# Patient Record
Sex: FEMALE | Race: WHITE | Employment: UNEMPLOYED | URBAN - METROPOLITAN AREA
[De-identification: names, ages, dates, MRNs, and addresses within clinical notes are randomized per-mention and may not be internally consistent; named-entity substitution may affect disease eponyms.]

---

## 2017-05-20 ENCOUNTER — COMMUNICATION - HEALTHEAST (OUTPATIENT)
Dept: SCHEDULING | Facility: CLINIC | Age: 62
End: 2017-05-20

## 2017-05-26 ENCOUNTER — HEALTH MAINTENANCE LETTER (OUTPATIENT)
Age: 62
End: 2017-05-26

## 2018-02-20 ENCOUNTER — RECORDS - HEALTHEAST (OUTPATIENT)
Dept: LAB | Facility: CLINIC | Age: 63
End: 2018-02-20

## 2018-02-21 LAB
25(OH)D3 SERPL-MCNC: 47.1 NG/ML (ref 30–80)
ALBUMIN SERPL-MCNC: 3.4 G/DL (ref 3.5–5)
ALP SERPL-CCNC: 103 U/L (ref 45–120)
ALT SERPL W P-5'-P-CCNC: 161 U/L (ref 0–45)
ANION GAP SERPL CALCULATED.3IONS-SCNC: 6 MMOL/L (ref 5–18)
AST SERPL W P-5'-P-CCNC: 115 U/L (ref 0–40)
BASOPHILS # BLD AUTO: 0 THOU/UL (ref 0–0.2)
BASOPHILS NFR BLD AUTO: 0 % (ref 0–2)
BILIRUB SERPL-MCNC: 0.6 MG/DL (ref 0–1)
BUN SERPL-MCNC: 15 MG/DL (ref 8–22)
CALCIUM SERPL-MCNC: 9.5 MG/DL (ref 8.5–10.5)
CHLORIDE BLD-SCNC: 104 MMOL/L (ref 98–107)
CHOLEST SERPL-MCNC: 179 MG/DL
CO2 SERPL-SCNC: 29 MMOL/L (ref 22–31)
CREAT SERPL-MCNC: 0.74 MG/DL (ref 0.6–1.1)
EOSINOPHIL # BLD AUTO: 0 THOU/UL (ref 0–0.4)
EOSINOPHIL NFR BLD AUTO: 1 % (ref 0–6)
ERYTHROCYTE [DISTWIDTH] IN BLOOD BY AUTOMATED COUNT: 13.2 % (ref 11–14.5)
FASTING STATUS PATIENT QL REPORTED: ABNORMAL
GFR SERPL CREATININE-BSD FRML MDRD: >60 ML/MIN/1.73M2
GLUCOSE BLD-MCNC: 77 MG/DL (ref 70–125)
HBA1C MFR BLD: 5.2 % (ref 4.2–6.1)
HCT VFR BLD AUTO: 42.2 % (ref 35–47)
HCV AB SERPL QL IA: POSITIVE
HDLC SERPL-MCNC: 49 MG/DL
HGB BLD-MCNC: 13.9 G/DL (ref 12–16)
LDLC SERPL CALC-MCNC: 112 MG/DL
LYMPHOCYTES # BLD AUTO: 1.7 THOU/UL (ref 0.8–4.4)
LYMPHOCYTES NFR BLD AUTO: 32 % (ref 20–40)
MCH RBC QN AUTO: 30.4 PG (ref 27–34)
MCHC RBC AUTO-ENTMCNC: 32.9 G/DL (ref 32–36)
MCV RBC AUTO: 92 FL (ref 80–100)
MONOCYTES # BLD AUTO: 0.6 THOU/UL (ref 0–0.9)
MONOCYTES NFR BLD AUTO: 11 % (ref 2–10)
NEUTROPHILS # BLD AUTO: 2.9 THOU/UL (ref 2–7.7)
NEUTROPHILS NFR BLD AUTO: 55 % (ref 50–70)
PLATELET # BLD AUTO: 241 THOU/UL (ref 140–440)
PMV BLD AUTO: 10.7 FL (ref 8.5–12.5)
POTASSIUM BLD-SCNC: 4.2 MMOL/L (ref 3.5–5)
PROT SERPL-MCNC: 7.6 G/DL (ref 6–8)
RBC # BLD AUTO: 4.57 MILL/UL (ref 3.8–5.4)
SODIUM SERPL-SCNC: 139 MMOL/L (ref 136–145)
TRIGL SERPL-MCNC: 92 MG/DL
TSH SERPL DL<=0.005 MIU/L-ACNC: 1.92 UIU/ML (ref 0.3–5)
WBC: 5.2 THOU/UL (ref 4–11)

## 2018-05-04 ENCOUNTER — RECORDS - HEALTHEAST (OUTPATIENT)
Dept: LAB | Facility: CLINIC | Age: 63
End: 2018-05-04

## 2018-05-04 LAB
ALBUMIN SERPL-MCNC: 3.6 G/DL (ref 3.5–5)
ALP SERPL-CCNC: 73 U/L (ref 45–120)
ALT SERPL W P-5'-P-CCNC: 18 U/L (ref 0–45)
ANION GAP SERPL CALCULATED.3IONS-SCNC: 10 MMOL/L (ref 5–18)
AST SERPL W P-5'-P-CCNC: 28 U/L (ref 0–40)
BASOPHILS # BLD AUTO: 0 THOU/UL (ref 0–0.2)
BASOPHILS NFR BLD AUTO: 0 % (ref 0–2)
BILIRUB SERPL-MCNC: 0.5 MG/DL (ref 0–1)
BUN SERPL-MCNC: 13 MG/DL (ref 8–22)
CALCIUM SERPL-MCNC: 9.6 MG/DL (ref 8.5–10.5)
CHLORIDE BLD-SCNC: 104 MMOL/L (ref 98–107)
CO2 SERPL-SCNC: 26 MMOL/L (ref 22–31)
CREAT SERPL-MCNC: 0.72 MG/DL (ref 0.6–1.1)
EOSINOPHIL # BLD AUTO: 0.1 THOU/UL (ref 0–0.4)
EOSINOPHIL NFR BLD AUTO: 1 % (ref 0–6)
ERYTHROCYTE [DISTWIDTH] IN BLOOD BY AUTOMATED COUNT: 12.9 % (ref 11–14.5)
GFR SERPL CREATININE-BSD FRML MDRD: >60 ML/MIN/1.73M2
GLUCOSE BLD-MCNC: 68 MG/DL (ref 70–125)
HCT VFR BLD AUTO: 42.5 % (ref 35–47)
HGB BLD-MCNC: 14 G/DL (ref 12–16)
LYMPHOCYTES # BLD AUTO: 3.7 THOU/UL (ref 0.8–4.4)
LYMPHOCYTES NFR BLD AUTO: 44 % (ref 20–40)
MCH RBC QN AUTO: 31.2 PG (ref 27–34)
MCHC RBC AUTO-ENTMCNC: 32.9 G/DL (ref 32–36)
MCV RBC AUTO: 95 FL (ref 80–100)
MONOCYTES # BLD AUTO: 0.9 THOU/UL (ref 0–0.9)
MONOCYTES NFR BLD AUTO: 10 % (ref 2–10)
NEUTROPHILS # BLD AUTO: 3.7 THOU/UL (ref 2–7.7)
NEUTROPHILS NFR BLD AUTO: 44 % (ref 50–70)
PLATELET # BLD AUTO: 246 THOU/UL (ref 140–440)
PMV BLD AUTO: 10.7 FL (ref 8.5–12.5)
POTASSIUM BLD-SCNC: 3.6 MMOL/L (ref 3.5–5)
PROT SERPL-MCNC: 7.9 G/DL (ref 6–8)
RBC # BLD AUTO: 4.49 MILL/UL (ref 3.8–5.4)
SODIUM SERPL-SCNC: 140 MMOL/L (ref 136–145)
WBC: 8.5 THOU/UL (ref 4–11)

## 2018-06-02 ENCOUNTER — HEALTH MAINTENANCE LETTER (OUTPATIENT)
Age: 63
End: 2018-06-02

## 2019-02-25 ENCOUNTER — OFFICE VISIT - HEALTHEAST (OUTPATIENT)
Dept: FAMILY MEDICINE | Facility: CLINIC | Age: 64
End: 2019-02-25

## 2019-02-25 DIAGNOSIS — Z12.11 SCREEN FOR COLON CANCER: ICD-10-CM

## 2019-02-25 DIAGNOSIS — R35.0 URINARY FREQUENCY: ICD-10-CM

## 2019-02-25 DIAGNOSIS — Z79.899 MEDICATION MANAGEMENT: ICD-10-CM

## 2019-02-25 DIAGNOSIS — Z13.0 SCREENING FOR DEFICIENCY ANEMIA: ICD-10-CM

## 2019-02-25 DIAGNOSIS — Z01.419 WELL FEMALE EXAM WITH ROUTINE GYNECOLOGICAL EXAM: ICD-10-CM

## 2019-02-25 DIAGNOSIS — Z12.4 SCREENING FOR CERVICAL CANCER: ICD-10-CM

## 2019-02-25 DIAGNOSIS — Z13.1 SCREENING FOR DIABETES MELLITUS: ICD-10-CM

## 2019-02-25 DIAGNOSIS — F29 PSYCHOSIS, UNSPECIFIED PSYCHOSIS TYPE (H): ICD-10-CM

## 2019-02-25 DIAGNOSIS — F25.9 SCHIZOAFFECTIVE DISORDER, UNSPECIFIED TYPE (H): ICD-10-CM

## 2019-02-25 DIAGNOSIS — N39.41 URGE INCONTINENCE OF URINE: ICD-10-CM

## 2019-02-25 DIAGNOSIS — Z11.3 SCREENING FOR STD (SEXUALLY TRANSMITTED DISEASE): ICD-10-CM

## 2019-02-25 DIAGNOSIS — Z13.220 SCREENING FOR HYPERLIPIDEMIA: ICD-10-CM

## 2019-02-25 DIAGNOSIS — Z12.31 VISIT FOR SCREENING MAMMOGRAM: ICD-10-CM

## 2019-02-25 LAB
ALBUMIN SERPL-MCNC: 3.5 G/DL (ref 3.5–5)
ALP SERPL-CCNC: 54 U/L (ref 45–120)
ALT SERPL W P-5'-P-CCNC: 19 U/L (ref 0–45)
ANION GAP SERPL CALCULATED.3IONS-SCNC: 10 MMOL/L (ref 5–18)
AST SERPL W P-5'-P-CCNC: 18 U/L (ref 0–40)
BILIRUB SERPL-MCNC: 0.4 MG/DL (ref 0–1)
BUN SERPL-MCNC: 13 MG/DL (ref 8–22)
CALCIUM SERPL-MCNC: 9.3 MG/DL (ref 8.5–10.5)
CHLORIDE BLD-SCNC: 107 MMOL/L (ref 98–107)
CO2 SERPL-SCNC: 23 MMOL/L (ref 22–31)
CREAT SERPL-MCNC: 0.74 MG/DL (ref 0.6–1.1)
ERYTHROCYTE [DISTWIDTH] IN BLOOD BY AUTOMATED COUNT: 11.7 % (ref 11–14.5)
GFR SERPL CREATININE-BSD FRML MDRD: >60 ML/MIN/1.73M2
GLUCOSE BLD-MCNC: 79 MG/DL (ref 70–125)
HCT VFR BLD AUTO: 40.8 % (ref 35–47)
HGB BLD-MCNC: 13.6 G/DL (ref 12–16)
HIV 1+2 AB+HIV1 P24 AG SERPL QL IA: NEGATIVE
MCH RBC QN AUTO: 30.2 PG (ref 27–34)
MCHC RBC AUTO-ENTMCNC: 33.4 G/DL (ref 32–36)
MCV RBC AUTO: 91 FL (ref 80–100)
PLATELET # BLD AUTO: 222 THOU/UL (ref 140–440)
PMV BLD AUTO: 7.9 FL (ref 7–10)
POTASSIUM BLD-SCNC: 4.1 MMOL/L (ref 3.5–5)
PROT SERPL-MCNC: 7.3 G/DL (ref 6–8)
RBC # BLD AUTO: 4.5 MILL/UL (ref 3.8–5.4)
SODIUM SERPL-SCNC: 140 MMOL/L (ref 136–145)
WBC: 5.8 THOU/UL (ref 4–11)

## 2019-02-25 ASSESSMENT — MIFFLIN-ST. JEOR: SCORE: 1584.45

## 2019-02-26 LAB
C TRACH DNA SPEC QL PROBE+SIG AMP: NEGATIVE
HPV SOURCE: NORMAL
HUMAN PAPILLOMA VIRUS 16 DNA: NEGATIVE
HUMAN PAPILLOMA VIRUS 18 DNA: NEGATIVE
HUMAN PAPILLOMA VIRUS FINAL DIAGNOSIS: NORMAL
HUMAN PAPILLOMA VIRUS OTHER HR: NEGATIVE
N GONORRHOEA DNA SPEC QL NAA+PROBE: NEGATIVE
SPECIMEN DESCRIPTION: NORMAL
T PALLIDUM AB SER QL: NEGATIVE

## 2019-03-04 ENCOUNTER — COMMUNICATION - HEALTHEAST (OUTPATIENT)
Dept: FAMILY MEDICINE | Facility: CLINIC | Age: 64
End: 2019-03-04

## 2019-03-04 LAB
BKR LAB AP ABNORMAL BLEEDING: NO
BKR LAB AP BIRTH CONTROL/HORMONES: ABNORMAL
BKR LAB AP CERVICAL APPEARANCE: NORMAL
BKR LAB AP GYN ADEQUACY: ABNORMAL
BKR LAB AP GYN INTERPRETATION: ABNORMAL
BKR LAB AP HPV REFLEX: ABNORMAL
BKR LAB AP LMP: ABNORMAL
BKR LAB AP PATIENT STATUS: ABNORMAL
BKR LAB AP PREVIOUS ABNORMAL: ABNORMAL
BKR LAB AP PREVIOUS NORMAL: ABNORMAL
HIGH RISK?: NO
PATH REPORT.COMMENTS IMP SPEC: ABNORMAL
RESULT FLAG (HE HISTORICAL CONVERSION): ABNORMAL

## 2019-03-20 ENCOUNTER — RECORDS - HEALTHEAST (OUTPATIENT)
Dept: ADMINISTRATIVE | Facility: OTHER | Age: 64
End: 2019-03-20

## 2019-03-28 ENCOUNTER — OFFICE VISIT - HEALTHEAST (OUTPATIENT)
Dept: FAMILY MEDICINE | Facility: CLINIC | Age: 64
End: 2019-03-28

## 2019-03-28 DIAGNOSIS — R09.81 NASAL CONGESTION: ICD-10-CM

## 2019-03-28 DIAGNOSIS — F25.9 SCHIZOAFFECTIVE DISORDER, UNSPECIFIED TYPE (H): ICD-10-CM

## 2019-03-28 DIAGNOSIS — F29 PSYCHOSIS, UNSPECIFIED PSYCHOSIS TYPE (H): ICD-10-CM

## 2019-03-28 DIAGNOSIS — N39.41 URGE INCONTINENCE OF URINE: ICD-10-CM

## 2019-03-28 DIAGNOSIS — F60.2 ANTISOCIAL PERSONALITY DISORDER (H): ICD-10-CM

## 2019-03-28 DIAGNOSIS — F19.10 POLYSUBSTANCE ABUSE (H): ICD-10-CM

## 2019-03-28 DIAGNOSIS — F17.219 CIGARETTE NICOTINE DEPENDENCE WITH NICOTINE-INDUCED DISORDER: ICD-10-CM

## 2019-03-28 DIAGNOSIS — F17.200 NICOTINE DEPENDENCE, UNCOMPLICATED, UNSPECIFIED NICOTINE PRODUCT TYPE: ICD-10-CM

## 2019-03-28 DIAGNOSIS — R87.612 LOW GRADE SQUAMOUS INTRAEPITHELIAL LESION ON CYTOLOGIC SMEAR OF CERVIX (LGSIL): ICD-10-CM

## 2019-03-28 ASSESSMENT — MIFFLIN-ST. JEOR: SCORE: 1578.49

## 2019-04-03 ENCOUNTER — COMMUNICATION - HEALTHEAST (OUTPATIENT)
Dept: FAMILY MEDICINE | Facility: CLINIC | Age: 64
End: 2019-04-03

## 2019-04-03 DIAGNOSIS — K59.01 SLOW TRANSIT CONSTIPATION: ICD-10-CM

## 2019-05-11 ENCOUNTER — COMMUNICATION - HEALTHEAST (OUTPATIENT)
Dept: FAMILY MEDICINE | Facility: CLINIC | Age: 64
End: 2019-05-11

## 2019-10-09 ENCOUNTER — COMMUNICATION - HEALTHEAST (OUTPATIENT)
Dept: FAMILY MEDICINE | Facility: CLINIC | Age: 64
End: 2019-10-09

## 2019-10-09 DIAGNOSIS — R35.0 URINARY FREQUENCY: ICD-10-CM

## 2020-02-25 ENCOUNTER — COMMUNICATION - HEALTHEAST (OUTPATIENT)
Dept: FAMILY MEDICINE | Facility: CLINIC | Age: 65
End: 2020-02-25

## 2020-02-25 DIAGNOSIS — K59.01 SLOW TRANSIT CONSTIPATION: ICD-10-CM

## 2020-02-26 ENCOUNTER — OFFICE VISIT - HEALTHEAST (OUTPATIENT)
Dept: FAMILY MEDICINE | Facility: CLINIC | Age: 65
End: 2020-02-26

## 2020-02-26 DIAGNOSIS — N39.41 URGE INCONTINENCE OF URINE: ICD-10-CM

## 2020-02-26 DIAGNOSIS — F25.9 SCHIZOAFFECTIVE DISORDER, UNSPECIFIED TYPE (H): ICD-10-CM

## 2020-02-26 DIAGNOSIS — F17.219 CIGARETTE NICOTINE DEPENDENCE WITH NICOTINE-INDUCED DISORDER: ICD-10-CM

## 2020-02-26 DIAGNOSIS — J02.9 SORE THROAT: ICD-10-CM

## 2020-02-26 DIAGNOSIS — G89.29 CHRONIC RIGHT SHOULDER PAIN: ICD-10-CM

## 2020-02-26 DIAGNOSIS — M25.511 CHRONIC RIGHT SHOULDER PAIN: ICD-10-CM

## 2020-02-26 DIAGNOSIS — R35.0 URINARY FREQUENCY: ICD-10-CM

## 2020-02-26 DIAGNOSIS — F29 PSYCHOSIS, UNSPECIFIED PSYCHOSIS TYPE (H): ICD-10-CM

## 2020-02-26 DIAGNOSIS — E55.9 VITAMIN D DEFICIENCY: ICD-10-CM

## 2020-02-26 DIAGNOSIS — Z12.4 SCREENING FOR MALIGNANT NEOPLASM OF CERVIX: ICD-10-CM

## 2020-02-26 DIAGNOSIS — Z09 HOSPITAL DISCHARGE FOLLOW-UP: ICD-10-CM

## 2020-02-26 DIAGNOSIS — Z11.4 ENCOUNTER FOR SCREENING FOR HUMAN IMMUNODEFICIENCY VIRUS (HIV): ICD-10-CM

## 2020-02-26 DIAGNOSIS — M54.50 CHRONIC BILATERAL LOW BACK PAIN WITHOUT SCIATICA: ICD-10-CM

## 2020-02-26 DIAGNOSIS — G89.29 CHRONIC BILATERAL LOW BACK PAIN WITHOUT SCIATICA: ICD-10-CM

## 2020-02-26 DIAGNOSIS — Z00.00 ROUTINE GENERAL MEDICAL EXAMINATION AT A HEALTH CARE FACILITY: ICD-10-CM

## 2020-02-26 DIAGNOSIS — Z11.3 SCREEN FOR STD (SEXUALLY TRANSMITTED DISEASE): ICD-10-CM

## 2020-02-26 LAB
DEPRECATED S PYO AG THROAT QL EIA: NORMAL
HIV 1+2 AB+HIV1 P24 AG SERPL QL IA: NEGATIVE

## 2020-02-26 ASSESSMENT — MIFFLIN-ST. JEOR: SCORE: 1605.15

## 2020-02-27 LAB
C TRACH DNA SPEC QL PROBE+SIG AMP: NEGATIVE
GROUP A STREP BY PCR: NORMAL
HPV SOURCE: NORMAL
HUMAN PAPILLOMA VIRUS 16 DNA: NEGATIVE
HUMAN PAPILLOMA VIRUS 18 DNA: NEGATIVE
HUMAN PAPILLOMA VIRUS FINAL DIAGNOSIS: NORMAL
HUMAN PAPILLOMA VIRUS OTHER HR: NEGATIVE
N GONORRHOEA DNA SPEC QL NAA+PROBE: NEGATIVE
SPECIMEN DESCRIPTION: NORMAL
T PALLIDUM AB SER QL: NEGATIVE

## 2020-02-28 ENCOUNTER — COMMUNICATION - HEALTHEAST (OUTPATIENT)
Dept: FAMILY MEDICINE | Facility: CLINIC | Age: 65
End: 2020-02-28

## 2020-03-04 ENCOUNTER — RECORDS - HEALTHEAST (OUTPATIENT)
Dept: ADMINISTRATIVE | Facility: OTHER | Age: 65
End: 2020-03-04

## 2020-03-09 ENCOUNTER — COMMUNICATION - HEALTHEAST (OUTPATIENT)
Dept: FAMILY MEDICINE | Facility: CLINIC | Age: 65
End: 2020-03-09

## 2020-03-24 ENCOUNTER — RECORDS - HEALTHEAST (OUTPATIENT)
Dept: ADMINISTRATIVE | Facility: OTHER | Age: 65
End: 2020-03-24

## 2020-03-30 ENCOUNTER — RECORDS - HEALTHEAST (OUTPATIENT)
Dept: ADMINISTRATIVE | Facility: OTHER | Age: 65
End: 2020-03-30

## 2020-04-16 ENCOUNTER — RECORDS - HEALTHEAST (OUTPATIENT)
Dept: ADMINISTRATIVE | Facility: OTHER | Age: 65
End: 2020-04-16

## 2020-04-23 ENCOUNTER — COMMUNICATION - HEALTHEAST (OUTPATIENT)
Dept: FAMILY MEDICINE | Facility: CLINIC | Age: 65
End: 2020-04-23

## 2020-05-01 ENCOUNTER — RECORDS - HEALTHEAST (OUTPATIENT)
Dept: ADMINISTRATIVE | Facility: OTHER | Age: 65
End: 2020-05-01

## 2020-06-01 ENCOUNTER — RECORDS - HEALTHEAST (OUTPATIENT)
Dept: ADMINISTRATIVE | Facility: OTHER | Age: 65
End: 2020-06-01

## 2020-06-10 ENCOUNTER — COMMUNICATION - HEALTHEAST (OUTPATIENT)
Dept: FAMILY MEDICINE | Facility: CLINIC | Age: 65
End: 2020-06-10

## 2020-06-22 LAB
BKR LAB AP ABNORMAL BLEEDING: NO
BKR LAB AP BIRTH CONTROL/HORMONES: ABNORMAL
BKR LAB AP CERVICAL APPEARANCE: NORMAL
BKR LAB AP GYN ADEQUACY: ABNORMAL
BKR LAB AP GYN INTERPRETATION: ABNORMAL
BKR LAB AP HPV REFLEX: ABNORMAL
BKR LAB AP LMP: ABNORMAL
BKR LAB AP PATIENT STATUS: ABNORMAL
BKR LAB AP PREVIOUS ABNORMAL: ABNORMAL
BKR LAB AP PREVIOUS NORMAL: ABNORMAL
HIGH RISK?: NO
PATH REPORT.COMMENTS IMP SPEC: ABNORMAL
PATH REPORT.COMMENTS IMP SPEC: ABNORMAL
RESULT FLAG (HE HISTORICAL CONVERSION): ABNORMAL

## 2020-07-01 ENCOUNTER — RECORDS - HEALTHEAST (OUTPATIENT)
Dept: ADMINISTRATIVE | Facility: OTHER | Age: 65
End: 2020-07-01

## 2020-07-22 ENCOUNTER — RECORDS - HEALTHEAST (OUTPATIENT)
Dept: ADMINISTRATIVE | Facility: OTHER | Age: 65
End: 2020-07-22

## 2020-08-05 ENCOUNTER — RECORDS - HEALTHEAST (OUTPATIENT)
Dept: LAB | Facility: CLINIC | Age: 65
End: 2020-08-05

## 2020-08-05 LAB
ALBUMIN UR-MCNC: ABNORMAL MG/DL
APPEARANCE UR: ABNORMAL
BACTERIA #/AREA URNS HPF: ABNORMAL HPF
BILIRUB UR QL STRIP: NEGATIVE
COLOR UR AUTO: YELLOW
GLUCOSE UR STRIP-MCNC: NEGATIVE MG/DL
HGB UR QL STRIP: NEGATIVE
KETONES UR STRIP-MCNC: ABNORMAL MG/DL
LEUKOCYTE ESTERASE UR QL STRIP: ABNORMAL
MUCOUS THREADS #/AREA URNS LPF: ABNORMAL LPF
NITRATE UR QL: NEGATIVE
PH UR STRIP: 6 [PH] (ref 4.5–8)
RBC #/AREA URNS AUTO: ABNORMAL HPF
SP GR UR STRIP: 1.02 (ref 1–1.03)
SQUAMOUS #/AREA URNS AUTO: >100 LPF
UROBILINOGEN UR STRIP-ACNC: ABNORMAL
WBC #/AREA URNS AUTO: ABNORMAL HPF

## 2020-08-06 LAB
ALBUMIN SERPL-MCNC: 3.5 G/DL (ref 3.5–5)
ALP SERPL-CCNC: 48 U/L (ref 45–120)
ALT SERPL W P-5'-P-CCNC: 14 U/L (ref 0–45)
ANION GAP SERPL CALCULATED.3IONS-SCNC: 10 MMOL/L (ref 5–18)
AST SERPL W P-5'-P-CCNC: 20 U/L (ref 0–40)
BACTERIA SPEC CULT: NORMAL
BASOPHILS # BLD AUTO: 0 THOU/UL (ref 0–0.2)
BASOPHILS NFR BLD AUTO: 1 % (ref 0–2)
BILIRUB SERPL-MCNC: 0.3 MG/DL (ref 0–1)
BUN SERPL-MCNC: 6 MG/DL (ref 8–22)
CALCIUM SERPL-MCNC: 9.1 MG/DL (ref 8.5–10.5)
CHLORIDE BLD-SCNC: 106 MMOL/L (ref 98–107)
CO2 SERPL-SCNC: 23 MMOL/L (ref 22–31)
CREAT SERPL-MCNC: 0.7 MG/DL (ref 0.6–1.1)
EOSINOPHIL # BLD AUTO: 0 THOU/UL (ref 0–0.4)
EOSINOPHIL NFR BLD AUTO: 1 % (ref 0–6)
ERYTHROCYTE [DISTWIDTH] IN BLOOD BY AUTOMATED COUNT: 13.8 % (ref 11–14.5)
GFR SERPL CREATININE-BSD FRML MDRD: >60 ML/MIN/1.73M2
GLUCOSE BLD-MCNC: 136 MG/DL (ref 70–125)
HCT VFR BLD AUTO: 39.9 % (ref 35–47)
HGB BLD-MCNC: 13.2 G/DL (ref 12–16)
LYMPHOCYTES # BLD AUTO: 1.9 THOU/UL (ref 0.8–4.4)
LYMPHOCYTES NFR BLD AUTO: 35 % (ref 20–40)
MCH RBC QN AUTO: 30.3 PG (ref 27–34)
MCHC RBC AUTO-ENTMCNC: 33.1 G/DL (ref 32–36)
MCV RBC AUTO: 92 FL (ref 80–100)
MONOCYTES # BLD AUTO: 0.7 THOU/UL (ref 0–0.9)
MONOCYTES NFR BLD AUTO: 13 % (ref 2–10)
NEUTROPHILS # BLD AUTO: 2.7 THOU/UL (ref 2–7.7)
NEUTROPHILS NFR BLD AUTO: 51 % (ref 50–70)
PLATELET # BLD AUTO: 239 THOU/UL (ref 140–440)
PMV BLD AUTO: 10.7 FL (ref 8.5–12.5)
POTASSIUM BLD-SCNC: 3.5 MMOL/L (ref 3.5–5)
PROT SERPL-MCNC: 7.1 G/DL (ref 6–8)
RBC # BLD AUTO: 4.35 MILL/UL (ref 3.8–5.4)
SODIUM SERPL-SCNC: 139 MMOL/L (ref 136–145)
VALPROATE SERPL-MCNC: 16 UG/ML (ref 50–150)
WBC: 5.4 THOU/UL (ref 4–11)

## 2020-08-26 ENCOUNTER — RECORDS - HEALTHEAST (OUTPATIENT)
Dept: ADMINISTRATIVE | Facility: OTHER | Age: 65
End: 2020-08-26

## 2020-10-26 ENCOUNTER — OFFICE VISIT - HEALTHEAST (OUTPATIENT)
Dept: FAMILY MEDICINE | Facility: CLINIC | Age: 65
End: 2020-10-26

## 2020-10-26 DIAGNOSIS — F19.10 POLYSUBSTANCE ABUSE (H): ICD-10-CM

## 2020-10-26 DIAGNOSIS — F25.9 SCHIZOAFFECTIVE DISORDER, UNSPECIFIED TYPE (H): ICD-10-CM

## 2020-10-26 DIAGNOSIS — L60.2 NAIL HYPERTROPHY: ICD-10-CM

## 2020-10-26 DIAGNOSIS — Z87.820 HISTORY OF TRAUMATIC BRAIN INJURY: ICD-10-CM

## 2020-10-26 DIAGNOSIS — F29 PSYCHOSIS, UNSPECIFIED PSYCHOSIS TYPE (H): ICD-10-CM

## 2020-10-26 DIAGNOSIS — Z09 HOSPITAL DISCHARGE FOLLOW-UP: ICD-10-CM

## 2020-11-18 ENCOUNTER — RECORDS - HEALTHEAST (OUTPATIENT)
Dept: ADMINISTRATIVE | Facility: OTHER | Age: 65
End: 2020-11-18

## 2020-12-18 ENCOUNTER — RECORDS - HEALTHEAST (OUTPATIENT)
Dept: ADMINISTRATIVE | Facility: OTHER | Age: 65
End: 2020-12-18

## 2020-12-23 ENCOUNTER — COMMUNICATION - HEALTHEAST (OUTPATIENT)
Dept: FAMILY MEDICINE | Facility: CLINIC | Age: 65
End: 2020-12-23

## 2020-12-23 DIAGNOSIS — R35.0 URINARY FREQUENCY: ICD-10-CM

## 2020-12-23 DIAGNOSIS — E55.9 VITAMIN D DEFICIENCY: ICD-10-CM

## 2020-12-23 DIAGNOSIS — K59.01 SLOW TRANSIT CONSTIPATION: ICD-10-CM

## 2021-01-15 ENCOUNTER — RECORDS - HEALTHEAST (OUTPATIENT)
Dept: ADMINISTRATIVE | Facility: OTHER | Age: 66
End: 2021-01-15

## 2021-02-15 ENCOUNTER — TRANSFERRED RECORDS (OUTPATIENT)
Dept: HEALTH INFORMATION MANAGEMENT | Facility: CLINIC | Age: 66
End: 2021-02-15

## 2021-02-25 ENCOUNTER — COMMUNICATION - HEALTHEAST (OUTPATIENT)
Dept: SCHEDULING | Facility: CLINIC | Age: 66
End: 2021-02-25

## 2021-03-15 ENCOUNTER — TRANSFERRED RECORDS (OUTPATIENT)
Dept: HEALTH INFORMATION MANAGEMENT | Facility: CLINIC | Age: 66
End: 2021-03-15

## 2021-03-19 ENCOUNTER — RECORDS - HEALTHEAST (OUTPATIENT)
Dept: ADMINISTRATIVE | Facility: OTHER | Age: 66
End: 2021-03-19

## 2021-04-05 ENCOUNTER — RECORDS - HEALTHEAST (OUTPATIENT)
Dept: ADMINISTRATIVE | Facility: OTHER | Age: 66
End: 2021-04-05

## 2021-05-05 ENCOUNTER — RECORDS - HEALTHEAST (OUTPATIENT)
Dept: ADMINISTRATIVE | Facility: OTHER | Age: 66
End: 2021-05-05

## 2021-05-27 NOTE — PROGRESS NOTES
Assessment / Impression     1. Schizoaffective disorder, unspecified type (H)     2. Psychosis, unspecified psychosis type (H)     3. Polysubstance abuse (H)     4. Antisocial personality disorder (H)     5. Urge incontinence of urine     6. Nicotine dependence, uncomplicated, unspecified nicotine product type  nicotine polacrilex (COMMIT) 4 MG lozenge   7. Cigarette nicotine dependence with nicotine-induced disorder     8. Nasal congestion  camphor-eucalyptus oil-menthol 4.8-1.2-2.6 % Oint    DISCONTINUED: camphor-eucalyptus oil-menthol 4.8-1.2-2.6 % Oint   9. Low grade squamous intraepithelial lesion on cytologic smear of cervix (LGSIL)         I have prescribed the patient a tobacco cessation medication and the follow up will occur  at the next visit.    Plan:     I recommended she continue her current medications and follow-up with psychiatry every month as scheduled.  She was given a refill of the nicotine lozenges and camphor-eucalyptus ointment that she likes to use as needed cold symptoms.  I recommended she follow-up in 1 year for physical exam and have a repeat Pap smear.  She may be seen sooner if needed.    Subjective:      HPI: Divya Ledezma is a 63 y.o. female who presents the clinic to establish care.  She was just seen by our nurse practitioner for physical exam last month.  She moved into [address] on 1/18/2019 due to chronic mental health problems.  Group home staff members present during the exam.  She has a medical history significant for schizophrenia, psychosis, polysubstance abuse, antisocial personality disorder, nicotine dependence and urge incontinence.  Her Pap smear results from last month showed LSIL, but the high risk HPV testing was negative.  It was recommended that she repeat a Pap smear one year.  Her other labs including STD testing, CMP and hemogram results were normal.  Overall, she feels like her health is stable.  She is seeing a psychiatrist every month.  They are  "making adjustments to her psychiatric medications as needed.  She is on oxybutynin for urge incontinence and she is requesting a refill of nicotine lozenges and a camphor-eucalyptus ointment that she uses for colds.  She is currently smoking about 7 cigarettes a day.     She was recently admitted into inpatient psych from 12/2/18 through 2/8/19 for suicidal ideation.        Review of Systems  All other systems reviewed and are negative.     Social History     Tobacco Use   Smoking Status Current Every Day Smoker     Types: Cigarettes   Smokeless Tobacco Never Used   Tobacco Comment    7 a day-every 2 hours.       No family history on file.    Objective:     /64 (Patient Site: Left Arm, Patient Position: Sitting, Cuff Size: Adult Large)   Pulse 76   Temp 98.7  F (37.1  C) (Oral)   Resp 16   Ht 5' 8.3\" (1.735 m)   Wt 215 lb (97.5 kg)   BMI 32.40 kg/m    Physical Examination: General appearance - alert, well appearing, and in no distress  Eyes: pupils equal and reactive, extraocular eye movements intact  Mouth: mucous membranes moist, pharynx normal without lesions  Neck: supple, no significant adenopathy  Lungs: clear to auscultation, no wheezes, rales or rhonchi, symmetric air entry  Heart: normal rate, regular rhythm, normal S1, S2, no murmurs, rubs, clicks or gallops  Neurological: alert, oriented, normal speech, no focal findings or movement disorder noted.    Extremities: No edema, no clubbing or cyanosis  Psychiatric:  Does not appear anxious or depressed.    No results found for this or any previous visit (from the past 168 hour(s)).    Current Outpatient Medications   Medication Sig     benztropine (COGENTIN) 1 MG tablet Take 1 mg by mouth 2 (two) times a day.     calcium-vitamin D 500 mg(1,250mg) -200 unit per tablet Take 1 tablet by mouth 2 (two) times a day with meals.     camphor-eucalyptus oil-menthol 4.8-1.2-2.6 % Oint Use as needed for cold symptoms     divalproex (DEPAKOTE ER) 250 MG 24 " hour tablet Take 1 tablet (250 mg total) by mouth daily.     divalproex (DEPAKOTE ER) 500 MG 24 hour tablet Take 1 tablet (500 mg total) by mouth at bedtime.     HYDROcodone-acetaminophen 5-325 mg per tablet Take 1 tablet by mouth 3 (three) times a day.     incontinence pad, liner, disp (PADS FOR WOMEN) Pads Place pad on underwear and replaced when soiled     multivitamin therapeutic tablet Take 1 tablet by mouth daily.     naloxone (NARCAN) 4 mg/actuation nasal spray 1 spray (4 mg dose) into one nostril for opioid reversal. Call 911. May repeat if no response in 3 minutes.     nicotine polacrilex (COMMIT) 4 MG lozenge Apply 1 lozenge (4 mg total) to the mouth or throat as needed for smoking cessation.     OLANZapine zydis (ZYPREXA) 15 MG disintegrating tablet Take 2 tablets (30 mg total) by mouth at bedtime.     oxybutynin (DITROPAN) 5 MG tablet Take 1 tablet (5 mg total) by mouth 2 (two) times a day.     risperiDONE microspheres (RISPERDAL CONSTA) 50 mg/2 mL injection Inject 2 mL (50 mg total) into the shoulder, thigh, or buttocks every 14 (fourteen) days.     senna-docusate (SENNOSIDES-DOCUSATE SODIUM) 8.6-50 mg tablet Take 2 tablets by mouth 2 (two) times a day.

## 2021-05-27 NOTE — TELEPHONE ENCOUNTER
Refill Approved    Rx renewed per Medication Renewal Policy. Medication was last renewed on 2/8/19.    Manuela Downey, [facility] Triage/Med Refill 4/4/2019     Requested Prescriptions   Pending Prescriptions Disp Refills     senna-docusate (SENNOSIDES-DOCUSATE SODIUM) 8.6-50 mg tablet 120 tablet 11     Sig: Take 2 tablets by mouth 2 (two) times a day.    GI Medications Refill Protocol Failed - 4/3/2019 12:18 PM       Failed - PCP or prescribing provider visit in last 12 or next 3 months.    Last office visit with prescriber/PCP: Visit date not found OR same dept: 3/28/2019 Doe Hedrick DO OR same specialty: 3/28/2019 Doe Hedrick DO  Last physical: Visit date not found Last MTM visit: Visit date not found   Next visit within 3 mo: Visit date not found  Next physical within 3 mo: Visit date not found  Prescriber OR PCP: No Primary Care Provider  Last diagnosis associated with med order: 1. Slow transit constipation  - senna-docusate (SENNOSIDES-DOCUSATE SODIUM) 8.6-50 mg tablet; Take 2 tablets by mouth 2 (two) times a day.  Dispense: 120 tablet; Refill: 0    If protocol passes may refill for 12 months if within 3 months of last provider visit (or a total of 15 months).

## 2021-05-28 NOTE — TELEPHONE ENCOUNTER
Who is calling:  Hesham FLOYD from [address]  Reason for Call:  Wanted to advise that patient had fall today. No injuries. Does not need a call back.  Date of last appointment with primary care: 03/28/2019  Okay to leave a detailed message: No

## 2021-05-29 ENCOUNTER — RECORDS - HEALTHEAST (OUTPATIENT)
Dept: ADMINISTRATIVE | Facility: CLINIC | Age: 66
End: 2021-05-29

## 2021-06-02 VITALS — WEIGHT: 216.31 LBS | BODY MASS INDEX: 32.78 KG/M2 | HEIGHT: 68 IN

## 2021-06-02 VITALS — HEIGHT: 68 IN | WEIGHT: 215 LBS | BODY MASS INDEX: 32.58 KG/M2

## 2021-06-02 NOTE — TELEPHONE ENCOUNTER
RN cannot approve Refill Request    RN can NOT refill this medication med is not covered by policy/route to provider. Last office visit: Visit date not found Last Physical: 2/25/2019 Last MTM visit: Visit date not found Last visit same specialty: 3/28/2019 Leslye Ochoa, Doe Cm, .  Next visit within 3 mo: Visit date not found  Next physical within 3 mo: Visit date not found      Shiloh Miranda, [facility] Triage/Med Refill 10/9/2019    Requested Prescriptions   Pending Prescriptions Disp Refills     oxybutynin (DITROPAN) 5 MG tablet [Pharmacy Med Name: OXYBUTYNIN 5MG TAB] 56 tablet      Sig: TAKE 1 TABLET BY MOUTH TWICE A DAY       There is no refill protocol information for this order

## 2021-06-04 VITALS
DIASTOLIC BLOOD PRESSURE: 60 MMHG | HEART RATE: 100 BPM | WEIGHT: 224.38 LBS | SYSTOLIC BLOOD PRESSURE: 102 MMHG | BODY MASS INDEX: 35.22 KG/M2 | HEIGHT: 67 IN | RESPIRATION RATE: 16 BRPM

## 2021-06-04 VITALS
TEMPERATURE: 97.7 F | BODY MASS INDEX: 37.1 KG/M2 | HEART RATE: 80 BPM | WEIGHT: 239 LBS | DIASTOLIC BLOOD PRESSURE: 68 MMHG | RESPIRATION RATE: 16 BRPM | SYSTOLIC BLOOD PRESSURE: 110 MMHG

## 2021-06-06 NOTE — TELEPHONE ENCOUNTER
Left message on work VM (only number we have for Pt) to call back    Upon call back please let pt know her test results are negative

## 2021-06-06 NOTE — TELEPHONE ENCOUNTER
----- Message from Doe Ochoa, DO sent at 2/28/2020  8:39 AM CST -----  Please let patient know that the lab results are negative.

## 2021-06-06 NOTE — TELEPHONE ENCOUNTER
----- Message from Doe Ochoa, DO sent at 3/9/2020  3:14 PM CDT -----  Please call the patient and let them know that the Pap smear results show some atypical cells of undetermined significance.  It is reassuring that the HPV (virus that can cause cervical cancer) testing was negative.  We should recheck this in 3 years.  Thanks, DE

## 2021-06-06 NOTE — PROGRESS NOTES
Assessment and Plan:       1. Routine general medical examination at a health care facility  Courage her to eat healthy foods and get regular physical activity.  She recently had several labs checked during her long hospitalization at [facility].  This included a normal cholesterol panel with an LDL of 85, normal BMP, LFTs, TSH and an unremarkable hemogram.  Her hemoglobin A1c was 5.3%.    2. Schizoaffective disorder, unspecified type (H)  She and her group home staff member report that mental health symptoms have been more stable since being discharged from the hospital.  She continues to see her psychiatrist regularly.    3. Psychosis, unspecified psychosis type (H)  She and her group home staff member report that mental health symptoms have been more stable since being discharged from the hospital.  She continues to see her psychiatrist regularly.    4. Hospital discharge follow-up  I personally reviewed the hospital discharge summary when she was admitted from 2/4/2020-2/21/2020 at [facility] for worsening psychotic symptoms.  I also reviewed lab results from that hospitalization.    Post Discharge Medication Reconciliation Status: discharge medications reconciled, continue medications without change      5. Sore throat  Her rapid strep test is negative.  Her symptoms are probably from a viral upper respiratory infection.  I recommended symptomatic cares.  This will be sent for culture.  - Rapid Strep A Screen-Throat  - Group A Strep, RNA Direct Detection, Throat    6. Urge incontinence of urine  She may continue oxybutynin and she was given a refill of incontinence pads.  - incontinence pad, liner, disp (PADS FOR WOMEN) Pads; Place pad on underwear and replaced when soiled  Dispense: 120 each; Refill: 6    7. Chronic bilateral low back pain without sciatica  She will continue to follow-up closely with her pain medication specialist at [facility] pain clinic in [place].  She is currently on Vicodin for this.    8. Chronic  right shoulder pain  Her group home staff member reports that she has a history of rotator cuff injury and they both report she has had issues with a frozen shoulder.  She will continue to follow-up closely with her pain medication specialist at [facility] pain clinic in [place].  She is currently on Vicodin for this.    9. Vitamin D deficiency  Recommended she continue her vitamin D supplement.  Her vitamin D level was 16 when she was hospitalized earlier this month.  - cholecalciferol, vitamin D3, 50 mcg (2,000 unit) Tab; Take 1 tablet (2,000 Units total) by mouth daily.  Dispense: 90 tablet; Refill: 3    10. Screen for STD (sexually transmitted disease)  She denies being sexually active over the past couple of years, but she would like to have these labs checked again.  - Chlamydia trachomatis & Neisseria gonorrhoeae, Amplified Detection  - HIV Antigen/Antibody Screening [place]  - Syphilis Screen, [place]    11. Screening for malignant neoplasm of cervix  - Gynecologic Cytology (PAP Smear)    12. Encounter for screening for human immunodeficiency virus (HIV)   - HIV Antigen/Antibody Screening [place]    13. Urinary frequency  - oxybutynin (DITROPAN) 5 MG tablet; Take 1 tablet (5 mg total) by mouth 2 (two) times a day.  Dispense: 56 tablet; Refill: 11     14.  Tobacco abuse  I recommended she stop smoking.  She is pre-contemplative about quitting.    The patient's current medical problems were reviewed.    I have counseled the patient for tobacco cessation and the follow up will occur  at the next visit.  The following health maintenance schedule was reviewed with the patient and provided in printed form in the after visit summary:   Health Maintenance   Topic Date Due     MAMMOGRAM  1955     ADVANCE CARE PLANNING  08/18/1974     PNEUMOCOCCAL IMMUNIZATION 19-64 MEDIUM RISK (1 of 1 - PPSV23) 08/18/1975     COLONOSCOPY  08/18/2006     ZOSTER VACCINES (1 of 2) 08/18/2006     INFLUENZA VACCINE RULE BASED (1)  08/01/2019     MEDICARE ANNUAL WELLNESS VISIT  02/25/2020     LIPID  02/21/2023     PAP SMEAR  02/25/2024     HPV TEST  02/25/2024     TD 18+ HE  05/19/2026     HEPATITIS C SCREENING  Completed     HIV SCREENING  Completed     TDAP ADULT ONE TIME DOSE  Completed        Subjective:   Chief Complaint: Divya Ledezma is an 64 y.o. female here for an Annual Wellness visit.   HPI: She denies concerns regarding hearing, vision, urination, bowel movements, sleep or mood.  She was recently admitted from 2/4/2020-2/21/2020 at [facility] for worsening psychotic symptoms.  According to the discharge summary it appears that she had been decompensating for the previous 6 weeks.  She believed that she was being poisoned by a group home staff and had difficulty providing self-care.  Her medications were adjusted and she and her group home staff member reports that things seem to be going better.  She continues to see her psychiatrist once a month.  She is also being seen at the [facility] pain clinic in [facility] for chronic low back pain and chronic right-sided shoulder pain.  They report that she has had a rotator cuff injury and issues with a frozen shoulder.     She is currently being in a group home, [facility] (since 1/18/2019) due to chronic mental health problems.    A group home staff member is present during the exam.  She has a medical history significant for schizophrenia, psychosis, polysubstance abuse, antisocial personality disorder, nicotine dependence and urge incontinence.  Her Pap smear results from last year showed LSIL, but the high risk HPV testing was negative.  It was recommended that she repeat a Pap smear one year.  Her other labs including STD testing, CMP and hemogram results were normal.  Overall, she feels like her health is stable. She is on oxybutynin for urge incontinence. She is currently smoking about 5-7 cigarettes a day.  He is not interested in quitting.    Describes having a sore throat that  started few days ago.  She reports being exposed to strep throat when she was hospitalized.  She would like to have a strep test done to evaluate this further.  She would also like to have STD labs checked.  She denies being sexually active over the previous 2 years.  During her recent hospitalization she had lab testing that showed a normal BMP, LFTs, TSH and cholesterol panel.  Her LDL was 85.  Her vitamin D was low at 16.  Her hemogram was normal with exception that her white blood cell count was slightly low at 3.17.    Review of Systems:   Please see above.  The rest of the review of systems are negative for all systems.    Patient Care Team:  Provider, No Primary Care as PCP - Doe Urena DO as Assigned PCP     Patient Active Problem List   Diagnosis     Psychosis, unspecified psychosis type (H)     Suicidal ideation     Schizoaffective disorder, unspecified type (H)     Bilateral low back pain without sciatica, unspecified chronicity     Cracked tooth     Cigarette nicotine dependence with nicotine-induced disorder     Past Medical History:   Diagnosis Date     Arthritis      Hypertension       No past surgical history on file.   No family history on file.   Social History     Socioeconomic History     Marital status: Single     Spouse name: Not on file     Number of children: Not on file     Years of education: Not on file     Highest education level: Not on file   Occupational History     Not on file   Social Needs     Financial resource strain: Not on file     Food insecurity:     Worry: Not on file     Inability: Not on file     Transportation needs:     Medical: Not on file     Non-medical: Not on file   Tobacco Use     Smoking status: Current Every Day Smoker     Types: Cigarettes     Smokeless tobacco: Never Used     Tobacco comment: 7 a day-every 2 hours.   Substance and Sexual Activity     Alcohol use: No     Frequency: Never     Drug use: No     Types: Other     Comment:  vicodin     Sexual activity: Not Currently   Lifestyle     Physical activity:     Days per week: Not on file     Minutes per session: Not on file     Stress: Not on file   Relationships     Social connections:     Talks on phone: Not on file     Gets together: Not on file     Attends Ca[employer] service: Not on file     Active member of club or organization: Not on file     Attends meetings of clubs or organizations: Not on file     Relationship status: Not on file     Intimate partner violence:     Fear of current or ex partner: Not on file     Emotionally abused: Not on file     Physically abused: Not on file     Forced sexual activity: Not on file   Other Topics Concern     Not on file   Social History Narrative     Not on file      Current Outpatient Medications   Medication Sig Dispense Refill     benztropine (COGENTIN) 1 MG tablet Take 0.5 mg by mouth 2 (two) times a day.        cholecalciferol, vitamin D3, 50 mcg (2,000 unit) Tab daily.       divalproex (DEPAKOTE ER) 500 MG 24 hour tablet Take 1 tablet (500 mg total) by mouth at bedtime. 30 tablet 0     HYDROcodone-acetaminophen 5-325 mg per tablet Take 1 tablet by mouth 3 (three) times a day. 21 tablet 0     incontinence pad, liner, disp (PADS FOR WOMEN) Pads Place pad on underwear and replaced when soiled 120 each 6     naloxone (NARCAN) 4 mg/actuation nasal spray 1 spray (4 mg dose) into one nostril for opioid reversal. Call 911. May repeat if no response in 3 minutes. 1 Box 0     nicotine polacrilex (COMMIT) 4 MG lozenge Apply 1 lozenge (4 mg total) to the mouth or throat as needed for smoking cessation. 216 each 3     OLANZapine (ZYPREXA) 20 MG tablet 2 (two) times a day.       oxybutynin (DITROPAN) 5 MG tablet TAKE 1 TABLET BY MOUTH TWICE A DAY 56 tablet 5     risperiDONE (RISPERDAL) 2 MG tablet 2 (two) times a day.       risperiDONE microspheres (RISPERDAL CONSTA) 50 mg/2 mL injection Inject 2 mL (50 mg total) into the shoulder, thigh, or buttocks every  "14 (fourteen) days. 2 vial 0     senna-docusate (PERICOLACE) 8.6-50 mg tablet Take 2 tablets by mouth 2 (two) times a day. 360 tablet 3     calcium-vitamin D 500 mg(1,250mg) -200 unit per tablet Take 1 tablet by mouth 2 (two) times a day with meals.       camphor-eucalyptus oil-menthol 4.8-1.2-2.6 % Oint Use as needed for cold symptoms 50 g 2     divalproex (DEPAKOTE ER) 250 MG 24 hour tablet Take 1 tablet (250 mg total) by mouth daily. 30 tablet 0     multivitamin therapeutic tablet Take 1 tablet by mouth daily.       OLANZapine zydis (ZYPREXA) 15 MG disintegrating tablet Take 2 tablets (30 mg total) by mouth at bedtime. 30 tablet 0     [START ON 3/2/2020] risperiDONE microspheres (RISPERDAL CONSTA) 50 mg/2 mL injection Inject 50 mg into the shoulder, thigh, or buttocks every 14 (fourteen) days.       No current facility-administered medications for this visit.       Objective:   Vital Signs:   Visit Vitals  /60 (Patient Site: Left Arm, Patient Position: Sitting, Cuff Size: Adult Large)   Pulse 100   Resp 16   Ht 5' 7.3\" (1.709 m)   Wt (!) 224 lb 6 oz (101.8 kg)   BMI 34.83 kg/m         VisionScreening:  No exam data present     PHYSICAL EXAM  General Appearance: Alert, cooperative, no distress, appears stated age  Head: Normocephalic, without obvious abnormality, atraumatic  Eyes: PERRL, conjunctiva/corneas clear, EOM's intact  Ears: Normal TM's and external ear canals, both ears  Nose: Nares normal, septum midline,mucosa normal, no drainage  Throat: Lips, mucosa, and tongue normal; teeth and gums normal.  No exudates seen.  Neck: Supple, symmetrical, trachea midline, no adenopathy;  thyroid: not enlarged, symmetric, no tenderness/mass/nodules  Back: Symmetric, no curvature, ROM normal, no CVA tenderness.  Nontender over the spine.  She is tender over the lumbar's paraspinal muscles.  Lungs: Clear to auscultation bilaterally, respirations unlabored  Breasts: Patient declined  Heart: Regular rate and rhythm, " S1 and S2 normal, no murmur, rub, or gallop   Abdomen: Soft, non-tender, bowel sounds active all four quadrants,  no masses, no organomegaly  Pelvic: Normal-appearing external female genitalia.  Normal-appearing vagina and cervix.  No adnexal masses or cervical motion tenderness on bimanual exam.  Extremities: Range of motion is diminished in the right shoulder.  Skin: Skin color, texture, turgor normal, no rashes or lesions  Lymph nodes: Cervical, supraclavicular, and axillary nodes normal  Neurologic: Alert.  Normal speech.  No focal deficits.  Deep tendon reflexes normal bilaterally        Assessment Results 2/26/2020   Activities of Daily Living No help needed   Instrumental Activities of Daily Living No help needed   Some recent data might be hidden     A Mini-Cog score of 0-2 suggests the possibility of dementia, score of 3-5 suggests no dementia    Identified Health Risks:     The patient was provided with suggestions to help her develop a healthy physical lifestyle.   She is at risk for lack of exercise and has been provided with information to increase physical activity for the benefit of her well-being.  Information on urinary incontinence and treatment options given to patient.  Information regarding advance directives (living l[place]), including where she can download the appropriate form, was provided to the patient via the AVS.

## 2021-06-06 NOTE — TELEPHONE ENCOUNTER
Left message to call back for: Divya  Information to relay to patient:  Please notify patient of results.

## 2021-06-06 NOTE — TELEPHONE ENCOUNTER
Patient Returning Call  Hesham FLOYD  Reason for call:  Returning phone call  Information relayed to patient:  Message below  Patient has additional questions:  No  If YES, what are your questions/concerns:  n/a  Okay to leave a detailed message?: No call back needed

## 2021-06-06 NOTE — TELEPHONE ENCOUNTER
Refill Approved    Rx renewed per Medication Renewal Policy. Medication was last renewed on 4/4/19.    Shanna Concepcion, [facility] Triage/Med Refill 2/25/2020     Requested Prescriptions   Pending Prescriptions Disp Refills     senna-docusate (PERICOLACE) 8.6-50 mg tablet [Pharmacy Med Name: SENNA-TIME S 8.6-50MG TAB] 120 tablet 11     Sig: TAKE 2 TABLETS BY MOUTH TWICE A DAY       GI Medications Refill Protocol Passed - 2/25/2020  9:11 AM        Passed - PCP or prescribing provider visit in last 12 or next 3 months.     Last office visit with prescriber/PCP: 3/28/2019 Doe Hedrick DO OR same dept: 3/28/2019 Doe Hedrick DO OR same specialty: 3/28/2019 Doe Hedrick DO  Last physical: Visit date not found Last MTM visit: Visit date not found   Next visit within 3 mo: Visit date not found  Next physical within 3 mo: Visit date not found  Prescriber OR PCP: Doe Ochoa DO  Last diagnosis associated with med order: 1. Slow transit constipation  - senna-docusate (PERICOLACE) 8.6-50 mg tablet [Pharmacy Med Name: SENNA-TIME S 8.6-50MG TAB]; TAKE 2 TABLETS BY MOUTH TWICE A DAY  Dispense: 120 tablet; Refill: 11    If protocol passes may refill for 12 months if within 3 months of last provider visit (or a total of 15 months).

## 2021-06-06 NOTE — TELEPHONE ENCOUNTER
Left message to call back for: Divya Pearson-EVERETT from [facility] will be calling.  Information to relay to patient:  Please notify Hesham FLOYD (consent to communicate in chart) of patient results.

## 2021-06-07 NOTE — TELEPHONE ENCOUNTER
Left message to call back for: Hesham  Information to relay to patient:  Left detailed message needing to know who Hesham is and where he is from and who is requesting this fax? Thank you.

## 2021-06-07 NOTE — TELEPHONE ENCOUNTER
Who is calling:  EVERETT Dahl   Reason for Call:  Caller stated that to fax the home health certification care plan scanned on 4/16/2020 faxed to [phone].  Date of last appointment with primary care: n/a  Okay to leave a detailed message: Yes  [phone]

## 2021-06-08 NOTE — TELEPHONE ENCOUNTER
Just checked fax-No fax yet.  Await fax.  Spoke with Missy and she faxed to  yesterday and we faxed to [place] at [place]. Copy in green folder at . To [place] to fax back.

## 2021-06-08 NOTE — TELEPHONE ENCOUNTER
Who is calling:  Missy, director of nursing  Reason for Call:  A form has been faxed for a current med order for the patient. The form needs to be signed & faxed back to [phone] for patient to be admitted to the facility on 6/26/2020  Date of last appointment with primary care: 2/26/2020  Okay to leave a detailed message: Yes

## 2021-06-12 NOTE — PROGRESS NOTES
"  Hospital Follow-up Visit:    Hospital/Nursing Home/IP Rehab [place] [facility]  Date of Admission: 9/29  Date of Discharge: 1012  Reason(s) for Admission: Pt did not want to stay where she was, so went to a crisis center and was admitted to the psychiatric    Was your hospitalization related to COVID-19? No  Problems taking medications regularly:  None  Medication changes since discharge: None  Problems adhering to non-medication therapy:  None    Summary of hospitalization:      She was admitted to the inpatient psychiatric fernandez at [facility] from 9/27/2020-10/10/2020.  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  She reports leaving her group home because she was unhappy living there and went to the emergency department.  Records indicate that she \"self-presented to APS under the influence of cocaine after eloping from her group home. Group home reported medication non-compliance. In APS she was noted to be irritable, paranoid, minimizes events and blaming group home staff for plotting against her.\" She was placed on 72 hour hold and admitted to inpatient psychiatry.  She admitted to smoking crack prior to admission.  Her urine drug screen was positive for cocaine on admission.  The hospital records indicate that she was \":cooperative, engaged, and pleasant on interview throughout her stay. 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 These grandiose delusions persisted throughout her stay and were likely chronic in nature.\"    She has been staying in a different group home which she likes much better.  Group home staff members present during the exam today.  She struggles with chronic low back pain symptoms.  She goes to " the pain clinic regularly.  She is wondering if she should have a new imaging study.  She describes having chronic pain in the low back.  It does not radiate down the legs.  She is also interested in seeing podiatry because her toenails are thickened and hard to clip.  These cause pain and she needs help trimming them.     Vitals:    10/26/20 1303   BP: 110/68   Pulse: 80   Resp: 16   Temp: 97.7  F (36.5  C)       General Appearance: Alert, cooperative, no distress, appears stated age  Head: Normocephalic, without obvious abnormality, atraumatic  Eyes: PERRL, conjunctiva/corneas clear, EOM's intact  Neck: Supple, symmetrical, trachea midline, no adenopathy;  thyroid: not enlarged, symmetric, no tenderness/mass/nodules  Back: Tender to palpation in the lumbar spine and paraspinal muscles in the lumbar region.  Nontender over the thoracic spine.    Lungs: Clear to auscultation bilaterally, respirations unlabored  Heart: Regular rate and rhythm, S1 and S2 normal, no murmur, rub, or gallop,  Abdomen: Soft, non-tender, bowel sounds active all four quadrants,  no masses, no organomegaly  Extremities: Extremities normal, atraumatic, no cyanosis or edema.  Several of the toenails are thickened and elongated.  Lymph nodes: Cervical, supraclavicular, and axillary nodes normal  Neurologic: He is alert.  Normal speech.  No focal deficits.  Normal deep tendon reflexes.   Psychiatric: Does not appear anxious or depressed. Appears mildly paranoid.  Answers questions appropriately.    1. Hospital discharge follow-up     2. Schizoaffective disorder, unspecified type (H)     3. Psychosis, unspecified psychosis type (H)     4. Polysubstance abuse (H)     5. History of traumatic brain injury     6. Nail hypertrophy  Ambulatory referral to Podiatry     She appears to be doing better since being discharged from the hospital.  I recommended she continue her current medications and follow-up closely with her psychiatrist in the pain  clinic.  I did not order any new imaging studies for the spine since she will be seeing her pain specialist in the next few weeks.  She was given a referral to podiatry to have the toenails evaluated and trimmed if possible.  I personally reviewed the hospital discharge summary including the lab results.  I also reviewed pelvic CT scan results from 5/20/2016 which showed a superior and inferior pubic ramus fracture as well as a nondisplaced right sacral fracture.  I do not see any imaging studies of the lumbar spine.  We will see if we can try and find these.      Diagnostic Tests/Treatments reviewed.  Follow up needed: I recommended she follow-up with pain clinic regarding imaging studies that she may need for her back pain symptoms.  Other Healthcare Providers Involved in Patient s Care:         Psychiatry and the pain clinic  Update since discharge: improved.      Post Discharge Medication Reconciliation: discharge medications reconciled, continue medications without change.  Plan of care communicated with patient

## 2021-06-15 NOTE — TELEPHONE ENCOUNTER
Triage call:   Would like to see Dr Gavin   States that she is having body aches all the time  States that she has chronic   States that these generalizes body aches have been on going for about 6 months  Rating pain 8/10- improves with movement   States that she has to force herself to get up and walk around and movement does seem to help decrease her pain    Advised that she should be seen within the next 2 weeks for an appointment. Patient agrees. Reviewed additional care advice with patient and she verbalizes understanding. Assisted in transferring to  scheduling since Dr Gavin is now at [facility].     Anabella Hernandez RN BSBA [facility] Triage/Med Refill 2/25/2021 2:34 PM    COVID 19 Nurse Triage Plan/Patient Instructions    Please be aware that novel coronavirus (COVID-19) may be circulating in the community. If you develop symptoms such as fever, cough, or SOB or if you have concerns about the presence of another infection including coronavirus (COVID-19), please contact your health care provider or visit  [email or web address].    Disposition/Instructions    In-Person Visit with provider recommended. Reference Visit Selection Guide.    Thank you for taking steps to prevent the spread of this virus.  o Limit your contact with others.  o Wear a simple mask to cover your cough.  o Wash your hands well and often.    Resources    [facility]: About COVID-19: [email or web address]    CDC: What to Do If You're Sick: www.cdc.gov/coronavirus/2019-ncov/about/steps-when-sick.html    CDC: Ending Home Isolation: www.cdc.gov/coronavirus/2019-ncov/hcp/disposition-in-home-patients.html     CDC: Caring for Someone: www.cdc.gov/coronavirus/2019-ncov/if-you-are-sick/care-for-someone.html     [facility]: Interim Guidance for Hospital Discharge to Home: www.health.[facility].[place]/diseases/coronavirus/hcp/hospdischarge.pdf    [facility] clinical trials (COVID-19 research studies):  [email or web address]-clinical-trials     Below are the COVID-19 hotlines at the [place] Department of [facility] ([facility]). Interpreters are available.   o For health questions: Call 991-204-5626 or 1-570.119.6009 (7 a.m. to 7 p.m.)  o For questions about schools and childcare: Call 176-386-2660 or 1-811.542.2754 (7 a.m. to 7 p.m.)     Reason for Disposition    Muscle aches are a chronic symptom (recurrent or ongoing AND present > 4 weeks)    Additional Information    Negative: Shock suspected (e.g., cold/pale/clammy skin, too weak to stand, low BP, rapid pulse)    Negative: Difficult to awaken or acting confused (e.g., disoriented, slurred speech)    Negative: Sounds like a life-threatening emergency to the triager    Negative: Chest pain    Negative: Arm pains with exertion (e.g., walking)    Negative: Muscle aches from influenza (flu) suspected    Negative: Sickle cell pain crisis (sickle cell attack) suspected    Negative: Muscle aches from heat exposure suspected    Negative: Lyme disease suspected (e.g., bull's eye rash or tick bite / exposure in past month)    Negative: Pain only in back    Negative: Pain in one arm OR arm pains caused by recent vigorous activity (e.g., sports, lifting, overuse)    Negative: Pain in one leg OR leg pains caused by recent vigorous activity (e.g. sports, lifting, overuse)    Negative: Rash over large area or most of the body (widespread or generalized)    Negative: Dark (cola colored) or red-colored urine    Negative: [1] Drinking very little AND [2] dehydration suspected (e.g., no urine > 12 hours, very dry mouth, very lightheaded)    Negative: Patient sounds very sick or weak to the triager    Negative: [1] SEVERE pain (e.g., excruciating, unable to do any normal activities) AND [2] not improved 2 hours after pain medicine    Negative: [1] SEVERE pain AND [2] taking a statin medicine (a lipid or cholesterol lowering drug)    Negative: Fever > 104 F (40 C)    Negative: [1]  Fever > 101 F (38.3 C) AND [2] age > 60    Negative: [1] Fever > 100.0 F (37.8 C) AND [2] bedridden (e.g., nursing home patient, CVA, chronic illness, recovering from surgery)    Negative: [1] Fever > 100.0 F (37.8 C) AND [2] indwelling urinary catheter (e.g., [place], [place])    Negative: [1] Fever > 100.0 F (37.8 C) AND [2] diabetes mellitus or weak immune system (e.g., HIV positive, cancer chemo, splenectomy, organ transplant, chronic steroids)    Negative: Fever present > 3 days (72 hours)    Negative: [1] Muscle aches are unexplained AND [2] occur within 1 month of a tick bite    Negative: Diabetes mellitus or weak immune system (e.g., HIV positive, cancer chemo, splenectomy, organ transplant, chronic steroids)    Negative: [1] MODERATE pain (e.g., interferes with normal activities) AND [2] present > 3 days    Negative: [1] MILD or MODERATE muscle aches or pain AND [2] taking a statin medicine (a lipid or cholesterol lowering drug)    Negative: [1] Age > 50 AND [2] bilateral shoulder pains present > 2 weeks    Negative: [1] MILD pain (e.g., does not interfere with normal activities) AND [2] present > 7 days    Protocols used: MUSCLE ACHES AND BODY PAIN-A-

## 2021-06-18 NOTE — PATIENT INSTRUCTIONS - HE
Patient Instructions by Doe Hedrick DO at 2/26/2020 10:40 AM     Author: Doe Hedrick DO Service: -- Author Type: Physician    Filed: 2/26/2020 10:34 AM Encounter Date: 2/26/2020 Status: Signed    : Doe Hedrick DO (Physician)         Patient Education     Your Health Risk Assessment indicates you feel you are not in good physical health.    A healthy lifestyle helps keep the body fit and the mind alert. It helps protect you from disease, helps you fight disease, and helps prevent chronic disease (disease that doesn't go away) from getting worse. This is important as you get older and begin to notice twinges in muscles and joints and a decline in the strength and stamina you once took for granted. A healthy lifestyle includes good healthcare, good nutrition, weight control, recreation, and regular exercise. Avoid harmful substances and do what you can to keep safe. Another part of a healthy lifestyle is stay mentally active and socially involved.    Good healthcare     Have a wellness visit every year.     If you have new symptoms, let us know right away. Don't wait until the next checkup.     Take medicines exactly as prescribed and keep your medicines in a safe place. Tell us if your medicine causes problems.   Healthy diet and weight control     Eat 3 or 4 small, nutritious, low-fat, high-fiber meals a day. Include a variety of fruits, vegetables, and whole-grain foods.     Make sure you get enough calcium in your diet. Calcium, vitamin D, and exercise help prevent osteoporosis (bone thinning).     If you live alone, try eating with others when you can. That way you get a good meal and have company while you eat it.     Try to keep a healthy weight. If you eat more calories than your body uses for energy, it will be stored as fat and you will gain weight.     Recreation   Recreation is not limited to sports and team events. It includes any activity that  provides relaxation, interest, enjoyment, and exercise. Recreation provides an outlet for physical, mental, and social energy. It can give a sense of worth and achievement. It can help you stay healthy.       Patient Education     Exercise for a Healthier Heart  You may wonder how you can improve the health of your heart. If youre thinking about exercise, youre on the right track. You dont need to become an athlete, but you do need a certain amount of brisk exercise to help strengthen your heart. If you have been diagnosed with a heart condition, your doctor may recommend exercise to help stabilize your condition. To help make exercise a habit, choose safe, fun activities.       Be sure to check with your health care provider before starting an exercise program.    Why exercise?  Exercising regularly offers many healthy rewards. It can help you do all of the following:    Improve your blood cholesterol levels to help prevent further heart trouble    Lower your blood pressure to help prevent a stroke or heart attack    Control diabetes, or reduce your risk of getting this disease    Improve your heart and lung function    Reach and maintain a healthy weight    Make your muscles stronger and more limber so you can stay active    Prevent falls and fractures by slowing the loss of bone mass (osteoporosis)    Manage stress better  Exercise tips  Ease into your routine. Set small goals. Then build on them.  Exercise on most days. Aim for a total of 150 or more minutes of moderate to  vigorous intensity activity each week. Consider 40 minutes, 3 to 4 times a week. For best results, activity should last for 40 minutes on average. It is OK to work up to the 40 minute period over time. Examples of moderate-intensity activity is walking one mile in 15 minutes or 30 to 45 minutes of yard work.  Step up your daily activity level. Along with your exercise program, try being more active throughout the day. Walk instead of drive.  Do more household tasks or yard work.  Choose one or more activities you enjoy. Walking is one of the easiest things you can do. You can also try swimming, riding a bike, or taking an exercise class.  Stop exercising and call your doctor if you:    Have chest pain or feel dizzy or lightheaded    Feel burning, tightness, pressure, or heaviness in your chest, neck, shoulders, back, or arms    Have unusual shortness of breath    Have increased joint or muscle pain    Have palpitations or an irregular heartbeat      [id number] [employer]. [address] [place] [address]. All rights reserved. This information is not intended as a substitute for professional medical care. Always follow your healthcare professional's instructions.         Patient Education   Urinary Incontinence, Female (Adult)  Urinary incontinence means loss of control of the bladder. This problem affects many women, especially as they get older. If you have incontinence, you may be embarrassed to ask for help. But know that this problem can be treated.  Types of Incontinence  There are different types of incontinence. Two of the main types are described here. You can have more than one type.    Stress incontinence. With this type, urine leaks when pressure (stress) is put on the bladder. This may happen when you cough, sneeze, or laugh. Stress incontinence most often occurs because the pelvic floor muscles that support the bladder and urethra are weak. This can happen after pregnancy and vaginal childbirth or a hysterectomy. It can also be due to excess body weight or hormone changes.    Urge incontinence (also called overactive bladder). With this type, a sudden urge to urinate is felt often. This may happen even though there may not be much urine in the bladder. The need to urinate often during the night is common. Urge incontinence most often occurs because of bladder spasms. This may be due to bladder irritation or infection.  Damage to bladder nerves or pelvic muscles, constipation, and certain medicines can also lead to urge incontinence.  Treatment of urinary incontinence depends on the cause. Further evaluation is needed to find the type you have. This will likely include an exam and certain tests. Based on the results, you and your healthcare provider can then plan treatment. Until a diagnosis is made, the home care tips below can help relieve symptoms.  Home care    Do pelvic floor muscle exercises, if they are prescribed. The pelvic floor muscles help support the bladder and urethra. Many women find that their symptoms improve when doing special exercises that strengthen these muscles. To do the exercises contract the muscles you would use to stop your stream of urine, but do this when youre not urinating. Hold for 10 seconds, then relax. Repeat 10 to 20 times in a row, at least 3 times a day. Your provider may give you other instructions for how to do the exercises and how often.    Keep a bladder diary. This helps track how often and how much you urinate over a set period of time. Bring this diary with you to your next visit with the provider. The information can help your provider learn more about your bladder problem.    Lose weight, if advised to by your provider. Excess weight puts pressure on the bladder. Your provider can help you create a weight-loss plan thats right for you. This may include exercising more and making certain diet changes.    Don't consume foods and drinks that may irritate the bladder. These can include alcohol and caffeinated drinks.    Quit smoking. Smoking and other tobacco use can lead to chronic cough that strains the pelvic floor muscles. Smoking may also damage the bladder and urethra. Talk with your provider about treatments or methods you can use to quit smoking.    If drinking large amounts of fluid causes you to have symptoms, you may be advised to limit your fluid intake. You may also be  advised to drink most of your fluids during the day and to limit fluids at night.    If youre worried about urine leakage or accidents, you may wear absorbent pads to catch urine. Change the pads often. This helps reduce discomfort. It may also reduce the risk of skin or bladder infections.  Follow-up care  Follow up with your healthcare provider, or as directed. It may take some to find the right treatment for your problem. Your treatment plan may include special therapies or medicines. Certain procedures or surgery may also be options. Be sure to discuss any questions you have with your provider.  When to seek medical advice  Call the healthcare provider right away if any of these occur:    Fever of 100.4 F (38 C) or higher, or as directed by your provider    Bladder pain or fullness    Abdominal swelling    Nausea or vomiting    Back pain    Weakness, dizziness or fainting  Date Last Reviewed: 10/1/2017    [id number] [employer]. [address] [place] [address]. All rights reserved. This information is not intended as a substitute for professional medical care. Always follow your healthcare professional's instructions.     Patient Education   Understanding Advance Care Planning  Advance care planning is the process of deciding ones own future medical care. It helps ensure that if you cant speak for yourself, your wishes can still be carried out. The plan is a series of legal documents that note a persons wishes. The documents vary by state. Advance care planning may be done when a person has a serious illness that is expected to get worse. It may be done before major surgery. And it can help you and your family be prepared in case of a major illness or injury. Advance care planning helps with making decisions at these times.       A health care proxy is a person who acts as the voice of a patient when the patient cant speak for himself or herself. The name of this role varies by state. It  may be called a Durable Medical Power of  or Durable Power of  for Healthcare. It may be called an agent, surrogate, or advocate. Or it may be called a representative or decision maker. It is an official duty that is identified by a legal document. The document also varies by state.    Why Is Advance Care Planning Important?  If a person communicates their healthcare wishes:    They will be given medical care that matches their values and goals.    Their family members will not be forced to make decisions in a crisis with no guidance.  Creating a Plan  Making an advance care plan is often done in 3 steps:    Thinking about ones wishes. To create an advance care plan, you should think about what kind of medical treatment you would want if you lose the ability to communicate. Are there any situations in which you would refuse or stop treatment? Are there therapies you would want or not want? And whom do you want to make decisions for you? There are many places to learn more about how to plan for your care. Ask your doctor or  for resources.    Picking a health care proxy. This means choosing a trusted person to speak for you only when you cant speak for yourself. When you cannot make medical decisions, your proxy makes sure the instructions in your advance care plan are followed. A proxy does not make decisions based on his or her own opinions. They must put aside those opinions and values if needed, and carry out your wishes.    Filling out the legal documents. There are several kinds of legal documents for advance care planning. Each one tells health care providers your wishes. The documents may vary by state. They must be signed and may need to be witnessed or notarized. You can cancel or change them whenever you wish. Depending on your state, the documents may include a Healthcare Proxy form, Living Will, Durable Medical Power of , Advance Directive, or others.  The Familys  Role  The best help a family can give is to support their loved ones wishes. Open and honest communication is vital. Family should express any concerns they have about the patients choices while the patient can still make decisions.    9417-0738 [employer]. [address] [place] [address], [place] [address]. All rights reserved. This information is not intended as a substitute for professional medical care. Always follow your healthcare professional's instructions.         Also, [employer] offers a free, downloadable health care directive that allows you to share your treatment choices and personal preferences if you cannot communicate your wishes. It also allows you to appoint another person (called a health care agent) to make health care decisions if you are unable to do so. You can download an advance directive by going here: [email or web address]     Patient Education   Personalized Prevention Plan  You are due for the preventive services outlined below.  Your care team is available to assist you in scheduling these services.  If you have already completed any of these items, please share that information with your care team to update in your medical record.  Health Maintenance   Topic Date Due   ? MAMMOGRAM  1955   ? ADVANCE CARE PLANNING  08/18/1974   ? PNEUMOCOCCAL IMMUNIZATION 19-64 MEDIUM RISK (1 of 1 - PPSV23) 08/18/1975   ? COLONOSCOPY  08/18/2006   ? ZOSTER VACCINES (1 of 2) 08/18/2006   ? INFLUENZA VACCINE RULE BASED (1) 08/01/2019   ? MEDICARE ANNUAL WELLNESS VISIT  02/25/2020   ? LIPID  02/21/2023   ? PAP SMEAR  02/25/2024   ? HPV TEST  02/25/2024   ? TD 18+ HE  05/19/2026   ? HEPATITIS C SCREENING  Completed   ? HIV SCREENING  Completed   ? TDAP ADULT ONE TIME DOSE  Completed

## 2021-06-18 NOTE — LETTER
Letter by Birgit Cruz CNP at      Author: Bigrit Cruz CNP Service: -- Author Type: --    Filed:  Encounter Date: 3/4/2019 Status: (Other)       Divya Ledezma  [address] [place] [address]             March 4, 2019         Dear Ms. Ledezma,    Below are the results from your recent visit:    Resulted Orders   Gynecologic Cytology (PAP Smear)   Result Value Ref Range    Case Report       Gynecologic Cytology Report                       Case: [id number]                                   Authorizing Provider:  Birgit Cruz CNP  Collected:           02/25/2019 1129              Ordering Location:     [facility]    Received:            02/25/2019 1153                                     Medicine                                                                     First Screen:          Viri Michaud CT                                                                               (ASCP)                                                                       Pathologist:           Toi Sauer MD                                                      Specimen:    SUREPATH PAP, SCREENING, Endocervical/cervical                                             Interpretation LSIL encompassing HPV/mild dysplasia/CIN1 (!) Negative for squamous intraepithelial lesion or malignancy.     Result Flag Abnormal (!) Normal    Specimen Adequacy       Satisfactory for evaluation, endocervical/transformation zone component absent    HPV Reflex? Yes regardless of result     HIGH RISK No     LMP/Menopause Date menopause     Abnormal Bleeding No     Pt Status n/a     Birth Control/Hormones None     Previous Normal/Date unknown     Prev Abn Date/Dx unknown     Cervical Appearance normal    Comprehensive Metabolic Panel   Result Value Ref Range    Sodium 140 136 - 145 mmol/L    Potassium 4.1 3.5 - 5.0 mmol/L    Chloride 107 98 - 107 mmol/L    CO2 23 22 - 31 mmol/L    Anion Gap, Calculation 10 5  - 18 mmol/L    Glucose 79 70 - 125 mg/dL    BUN 13 8 - 22 mg/dL    Creatinine 0.74 0.60 - 1.10 mg/dL    GFR MDRD Af Amer >60 >60 mL/min/1.73m2    GFR MDRD Non Af Amer >60 >60 mL/min/1.73m2    Bilirubin, Total 0.4 0.0 - 1.0 mg/dL    Calcium 9.3 8.5 - 10.5 mg/dL    Protein, Total 7.3 6.0 - 8.0 g/dL    Albumin 3.5 3.5 - 5.0 g/dL    Alkaline Phosphatase 54 45 - 120 U/L    AST 18 0 - 40 U/L    ALT 19 0 - 45 U/L    Narrative    Fasting Glucose reference range is 70-99 mg/dL per  American Diabetes Association (ADA) guidelines.   HM2(CBC w/o Differential)   Result Value Ref Range    WBC 5.8 4.0 - 11.0 thou/uL    RBC 4.50 3.80 - 5.40 mill/uL    Hemoglobin 13.6 12.0 - 16.0 g/dL    Hematocrit 40.8 35.0 - 47.0 %    MCV 91 80 - 100 fL    MCH 30.2 27.0 - 34.0 pg    MCHC 33.4 32.0 - 36.0 g/dL    RDW 11.7 11.0 - 14.5 %    Platelets 222 140 - 440 thou/uL    MPV 7.9 7.0 - 10.0 fL   Syphilis Screen, Cascade   Result Value Ref Range    Treponema Antibody (Syphilis) Negative Negative   Chlamydia trachomatis & Neisseria gonorrhoeae, Amplified Detection   Result Value Ref Range    Chlamydia trachomatis, Amplified Detection Negative Negative    Neisseria gonorrhoeae, Amplified Detection Negative Negative   HIV Antigen/Antibody Screening Cascade   Result Value Ref Range    HIV Antigen / Antibody Negative Negative    Narrative    Method is Abbott HIV Ag/Ab for the detection of HIV p24 antigen, HIV-1 antibodies and HIV-2 antibodies.   HPV High Risk DNA Cervical   Result Value Ref Range    HPV Source SurePath     HPV16 DNA Negative NEG    HPV18 DNA Negative NEG    Other HR HPV Negative NEG    Final Diagnosis SEE NOTES       Comment:      This patient's sample is negative for HPV DNA.  This test was developed and its performance characteristics determined by the  [facility]. It  has not been cleared or approved by the FDA. The laboratory is regulated under  CLIA as qualified to  perform high-complexity testing. This test is used for  clinical purposes. It should not be regarded as investigational or for  research.  (Note)  METHODOLOGY:  The Roche hien 4800 system uses automated extraction,  simultaneous amplification of HPV (L1 region) and beta-globin,  followed by  real time detection of fluorescent labeled HPV and beta  globin using specific oligonucleotide probes . The test specifically  identifies types HPV 16 DNA and HPV 18 DNA while concurrently  detecting the rest of the high risk types (31, 33, 35, 39, 45, 51,  52, 56, 58, 59, 66 or 68).    COMMENTS:  This test is not intended for use as a screening device  for women under age 30 with normal cervical   cytology.  Results should  be correlated with cytologic and histologic findings. Close clinical  followup is recommended.        Specimen Description Cervical Cells       Comment:      PERFORMED AT  [address] [address]        Pap smear was abnormal.  Recommend repeating Pap smear in 1 year.  Remainder of blood work was within normal limits.    Please call with questions or contact us using [employer].    Sincerely,        Electronically signed by Birgit Cruz CNP

## 2021-06-20 NOTE — LETTER
Letter by Doe Hedrick DO at      Author: Doe Hedrick DO Service: -- Author Type: --    Filed:  Encounter Date: 2/28/2020 Status: (Other)         Divya Ledezma  [address] [place] [address]             February 28, 2020        Dear Ms. Darien,    Below are the results from your recent visit:    Resulted Orders   Rapid Strep A Screen-Throat   Result Value Ref Range    Rapid Strep A Antigen No Group A Strep detected, presumptive negative No Group A Strep detected, presumptive negative   Group A Strep, RNA Direct Detection, Throat   Result Value Ref Range    Group A Strep by PCR No Group A Strep rRNA detected No Group A Strep rRNA detected    Narrative    Intended Use:  The GEN-PROBE Group A Streptococcus direct test is a DNA probe assay which uses nucleic acid hybridization for the qualitative detection of Group A Streptococcal RNA to aid in the diagnosis of Group A Streptococcal pharyngitis from throat swabs.    Methodology:  The GEN-PROBE DNA probe assay uses a single-stranded DNA probe with a chemiluminescent label, which is complementary to the ribosomal RNA of the target organism.  The labeled DNA probe combines with the ribosomal RNA to form a stable DNA:RNA hybrid.  The labeled DNA:RNA hybrids are measured in GEN-PROBE luminometer.  A positive result is a luminometer reading greater than or equal to the cut-off.  A value below this cut-off is a negative result.     Chlamydia trachomatis & Neisseria gonorrhoeae, Amplified Detection   Result Value Ref Range    Chlamydia trachomatis, Amplified Detection Negative Negative    Neisseria gonorrhoeae, Amplified Detection Negative Negative   HIV Antigen/Antibody Screening Cascade   Result Value Ref Range    HIV Antigen / Antibody Negative Negative    Narrative    Method is Abbott HIV Ag/Ab for the detection of HIV p24 antigen, HIV-1 antibodies and HIV-2 antibodies.   Syphilis Screen, Cascade   Result Value Ref Range    Treponema  Antibody (Syphilis) Negative Negative       Your lab results are negative.      Please call with questions or contact us using [employer].    Sincerely,        Electronically signed by Doe Ochoa, DO

## 2021-06-24 NOTE — TELEPHONE ENCOUNTER
Results for orders placed or performed in visit on 02/25/19   Chlamydia trachomatis & Neisseria gonorrhoeae, Amplified Detection   Result Value Ref Range    Chlamydia trachomatis, Amplified Detection Negative Negative    Neisseria gonorrhoeae, Amplified Detection Negative Negative   Comprehensive Metabolic Panel   Result Value Ref Range    Sodium 140 136 - 145 mmol/L    Potassium 4.1 3.5 - 5.0 mmol/L    Chloride 107 98 - 107 mmol/L    CO2 23 22 - 31 mmol/L    Anion Gap, Calculation 10 5 - 18 mmol/L    Glucose 79 70 - 125 mg/dL    BUN 13 8 - 22 mg/dL    Creatinine 0.74 0.60 - 1.10 mg/dL    GFR MDRD Af Amer >60 >60 mL/min/1.73m2    GFR MDRD Non Af Amer >60 >60 mL/min/1.73m2    Bilirubin, Total 0.4 0.0 - 1.0 mg/dL    Calcium 9.3 8.5 - 10.5 mg/dL    Protein, Total 7.3 6.0 - 8.0 g/dL    Albumin 3.5 3.5 - 5.0 g/dL    Alkaline Phosphatase 54 45 - 120 U/L    AST 18 0 - 40 U/L    ALT 19 0 - 45 U/L   HM2(CBC w/o Differential)   Result Value Ref Range    WBC 5.8 4.0 - 11.0 thou/uL    RBC 4.50 3.80 - 5.40 mill/uL    Hemoglobin 13.6 12.0 - 16.0 g/dL    Hematocrit 40.8 35.0 - 47.0 %    MCV 91 80 - 100 fL    MCH 30.2 27.0 - 34.0 pg    MCHC 33.4 32.0 - 36.0 g/dL    RDW 11.7 11.0 - 14.5 %    Platelets 222 140 - 440 thou/uL    MPV 7.9 7.0 - 10.0 fL   Syphilis Screen, Cascade   Result Value Ref Range    Treponema Antibody (Syphilis) Negative Negative   HIV Antigen/Antibody Screening Cascade   Result Value Ref Range    HIV Antigen / Antibody Negative Negative   Gynecologic Cytology (PAP Smear)   Result Value Ref Range    Case Report       Gynecologic Cytology Report                       Case: [id number]                                   Authorizing Provider:  Birgit Cruz CNP  Collected:           02/25/2019 1129              Ordering Location[facility]     [facility]    Received:            02/25/2019 1153                                     Medicine                                                                      First Screen:          Viri Michaud CT                                                                               (ASCP)                                                                       Pathologist:           Toi Sauer MD                                                      Specimen:    SUREPATH PAP, SCREENING, Endocervical/cervical                                             Interpretation LSIL encompassing HPV/mild dysplasia/CIN1 (!) Negative for squamous intraepithelial lesion or malignancy.     Result Flag Abnormal (!) Normal    Specimen Adequacy       Satisfactory for evaluation, endocervical/transformation zone component absent    HPV Reflex? Yes regardless of result     HIGH RISK No     LMP/Menopause Date menopause     Abnormal Bleeding No     Pt Status n/a     Birth Control/Hormones None     Previous Normal/Date unknown     Prev Abn Date/Dx unknown     Cervical Appearance normal    HPV High Risk DNA Cervical   Result Value Ref Range    HPV Source SurePath     HPV16 DNA Negative NEG    HPV18 DNA Negative NEG    Other HR HPV Negative NEG    Final Diagnosis SEE NOTES     Specimen Description Cervical Cells      Pap smear was abnormal.  Recommend repeating Pap smear in 1 year.  Remainder of blood work was within normal limits.

## 2021-06-27 NOTE — PROGRESS NOTES
Progress Notes by Birgit Cruz CNP at 2/25/2019 10:40 AM     Author: Birgit Cruz CNP Service: -- Author Type: Nurse Practitioner    Filed: 2/25/2019  2:15 PM Encounter Date: 2/25/2019 Status: Signed    : Birgit Cruz CNP (Nurse Practitioner)       FEMALE PREVENTATIVE EXAM    Assessment and Plan:     1. Well female exam with routine gynecological exam  Healthy female exam completed today. Discussed lifestyle modifications including weight loss, eating a balanced diet and getting regular cardiovascular exercise. Discussed self breast exams and how to complete these. Pap Smear updated today. Plan yearly annual physicals or sooner as needed.      2. Psychosis, unspecified psychosis type (H)  Will be following up with psychiatry.  Medications reviewed today.  No refills were provided as patient will be obtaining medication refills for her psychiatric medications from her psychiatrist.    3. Schizoaffective disorder, unspecified type (H)  Same as #2    4. Urinary frequency  Patient has increased urinary frequency.  Gave patient a prescription for incontinence pads.  I did give her a refill of her oxybutynin which she was taking at the hospital.  - incontinence pad, liner, disp (PADS FOR WOMEN) Pads; Place pad on underwear and replaced when soiled  Dispense: 120 each; Refill: 6  - oxybutynin (DITROPAN) 5 MG tablet; Take 1 tablet (5 mg total) by mouth 2 (two) times a day.  Dispense: 60 tablet; Refill: 6    5. Screening for hyperlipidemia  Screening completed today    6. Screening for diabetes mellitus  Screening completed today.   - Comprehensive Metabolic Panel    7. Screening for deficiency anemia  Screening today.   - HM2(CBC w/o Differential)    8. Screening for cervical cancer  Pap smear completed today on patient.  - Gynecologic Cytology (PAP Smear)    9. Medication management  Medication management with a comprehensive metabolic will be done today.  - Comprehensive Metabolic Panel  -  HM2(CBC w/o Differential)    10. Screening for STD (sexually transmitted disease)  She has had negative screening previously.  We will screen again today for patient reassurance.  - Syphilis Screen, [place]  - Chlamydia trachomatis & Neisseria gonorrhoeae, Amplified Detection  - HIV Antigen/Antibody Screening [place]    11. Urge incontinence of urine  Was placed for incontinence pads.  - incontinence pad, liner, disp (PADS FOR WOMEN) Pads; Place pad on underwear and replaced when soiled  Dispense: 120 each; Refill: 6    Next follow up:  No Follow-up on file.    Immunization Review  Adult Imm Review: No immunizations due today  BMI: 32.6    I discussed the following with the patient:   Adult Healthy Living: Importance of regular exercise    Subjective:   Chief Complaint: Divya Ledezma is an 63 y.o. female here for a preventative health visit.     HPI:  She moved into [address] on 1/18 to reside permanently for her chronic mental health.  With her today is [place] a  from her group home.  She has chronic mental illness and histry of many hopsitlizations. She takes her medications as prescribed. Her mood has been unstable according to her care worker. She feels that her mood is ok. She is paranoid, she fears that someone is going to steal her underwear.  When reading notes it sounds as though she has extreme paranoia and persistent delusions of sexual abuse and accusations.  She sees her psychiatrist monthly. She may start seeing her therapist. She also had concerns regarding STD infection.  She denies any recent sexual encounters.  She does not know how she would have gotten an STD.  He denies any current symptoms with any STDs including denying vaginal discharge, itching, or pain.    She was recently admitted into inpatient psych from 12/2/18 through 2/8/19 for suicidal ideation.  Please see this note that is available to review in epic.  She was previously a patient through [facility].   "She was a resident through [facility] being admitted there from 10/31/18 under civil commitment by [facility].  She has an active problem list including psychosis, suicidal ideation, schizoaffective disorder, low back pain, idiopathic hypertension, cracked teeth, and hemorrhoids.    She has appointment scheduled to reestablish with a psychiatrist.  She is seeing a dentist next week.    Healthy Habits  Are you taking a daily aspirin? No  Do you typically exercising at least 40 min, 3-4 times per week?  NO  Do you usually eat at least 4 servings of fruit and vegetables a day, include whole grains and fiber and avoid regularly eating high fat foods? Yes  Have you had an eye exam in the past two years? Yes  Do you see a dentist twice per year? NO  Do you have any concerns regarding sleep? No    Safety Screen  If you own firearms, are they secured in a locked gun cabinet or with trigger locks? The patient does not own any firearms  Do you feel you are safe where you are living?: Yes (2/25/2019 11:03 AM)  Do you feel you are safe in your relationship(s)?: Yes (2/25/2019 11:03 AM)      Review of Systems:  Please see above.  The rest of the review of systems are negative for all systems.     Cancer Screening       Status Date      MAMMOGRAM Overdue 1956     PAP SMEAR Overdue 8/18/1986     COLONOSCOPY Overdue 8/18/2006           History     Not marked as reviewed during this visit.            Objective:   Vital Signs:   Visit Vitals  /70 (Patient Site: Left Arm, Patient Position: Sitting, Cuff Size: Adult Large)   Pulse 88   Temp 97.6  F (36.4  C) (Oral)   Resp 16   Ht 5' 8.3\" (1.735 m)   Wt 216 lb 5 oz (98.1 kg)   BMI 32.60 kg/m           PHYSICAL EXAM  /70 (Patient Site: Left Arm, Patient Position: Sitting, Cuff Size: Adult Large)   Pulse 88   Temp 97.6  F (36.4  C) (Oral)   Resp 16   Ht 5' 8.3\" (1.735 m)   Wt 216 lb 5 oz (98.1 kg)   BMI 32.60 kg/m      General Appearance:   "  Alert, cooperative, no distress, appears stated age. Wearing multiple layers of clothing. Has on multiple pairs of underwear.    Head:    Normocephalic, without obvious abnormality, atraumatic   Eyes:    PERRL, conjunctiva/corneas clear, EOM's intact, fundi     benign, both eyes   Ears:    Normal TM's and external ear canals, both ears   Nose:   Nares normal, septum midline, mucosa normal, no drainage     or sinus tenderness   Throat:   Lips, mucosa, and tongue normal; teeth and gums normal   Neck:   Supple, symmetrical, trachea midline, no adenopathy;     thyroid:  no enlargement/tenderness/nodules; no carotid    bruit or JVD   Back:     Symmetric, no curvature, ROM normal, no CVA tenderness   Lungs:     Clear to auscultation bilaterally, respirations unlabored   Chest Wall:    No tenderness or deformity    Heart:    Regular rate and rhythm, S1 and S2 normal, no murmur, rub    or gallop   Breast Exam:    No tenderness, masses, or nipple abnormality   Abdomen:     Soft, non-tender, bowel sounds active all four quadrants,     no masses, no organomegaly   Genitalia:    Normal female without lesion, discharge or tenderness   Extremities:   Extremities normal, atraumatic, no cyanosis or edema   Pulses:   2+ and symmetric all extremities   Skin:   Skin color, texture, turgor normal, no rashes or lesions   Lymph nodes:   Cervical, supraclavicular, and axillary nodes normal   Neurologic:   CNII-XII intact, normal strength, sensation and reflexes     Throughout     Psych:  Flat affect with some paranoia present. Conversant and redirectable.        Medication List           Accurate as of 2/25/19  2:12 PM. If you have any questions, ask your nurse or doctor.               START taking these medications    incontinence pad, liner, disp Pads  Also known as:  PADS FOR WOMEN  INSTRUCTIONS:  Place pad on underwear and replaced when soiled           CONTINUE taking these medications    calcium-vitamin D 500 mg(1,250mg) -200 unit  per tablet  INSTRUCTIONS:  Take 1 tablet by mouth 2 (two) times a day with meals.  Reason for med:  Osteoporosis, Prevention of Vitamin D Deficiency        camphor-eucalyptus oil-menthol 4.8-1.2-2.6 % Oint  INSTRUCTIONS:  Use as needed  Reason for med:  nasal congestion        * divalproex 500 MG 24 hour tablet  Also known as:  DEPAKOTE ER  INSTRUCTIONS:  Take 1 tablet (500 mg total) by mouth at bedtime.  Reason for med:  Depression associated with Bipolar Disorder        * divalproex 250 MG 24 hour tablet  Also known as:  DEPAKOTE ER  INSTRUCTIONS:  Take 1 tablet (250 mg total) by mouth daily.  Reason for med:  Bipolar Disorder in Remission        HYDROcodone-acetaminophen 5-325 mg per tablet  INSTRUCTIONS:  Take 1 tablet by mouth 3 (three) times a day.  Doctor's comments:  7 day supply, please contact primary care for any further refills  Reason for med:  Pain        hydrocortisone 2.5 % rectal cream  Also known as:  ANUSOL-HC  INSTRUCTIONS:  Apply to hemorrhoids two times a day prn  Reason for med:  hemorrhoids        lidocaine 5 % ointment  Also known as:  XYLOCAINE  INSTRUCTIONS:  Apply to affected area daily  Reason for med:  Minor Skin Wound Pain        loratadine 10 mg tablet  Also known as:  CLARITIN  INSTRUCTIONS:  Take 1 tablet (10 mg total) by mouth daily.  Reason for med:  allergic rhinitis        multivitamin therapeutic tablet  INSTRUCTIONS:  Take 1 tablet by mouth daily.  Reason for med:  vitamin deficiency        naloxone 4 mg/actuation nasal spray  Also known as:  NARCAN  INSTRUCTIONS:  1 spray (4 mg dose) into one nostril for opioid reversal. Call 911. May repeat if no response in 3 minutes.  Reason for med:  Opiate-Induced Respiratory Depression, Opioid Toxicity        nicotine polacrilex 4 MG lozenge  Also known as:  COMMIT  INSTRUCTIONS:  Apply 1 lozenge (4 mg total) to the mouth or throat as needed for smoking cessation.  Doctor's comments:  Prn every hour while awake  Reason for med:  Smoking  Cessation        * OLANZapine 5 MG tablet  Also known as:  zyPREXA  INSTRUCTIONS:  Take 1 tablet (5 mg total) by mouth 3 (three) times a day as needed (psychosis).  Reason for med:  psychosis        * OLANZapine zydis 15 MG disintegrating tablet  Also known as:  ZyPREXA  INSTRUCTIONS:  Take 2 tablets (30 mg total) by mouth at bedtime.  Reason for med:  psychosis        * OLANZapine zydis 10 MG disintegrating tablet  Also known as:  ZyPREXA  INSTRUCTIONS:  Take 1 tablet (10 mg total) by mouth daily.  Reason for med:  psychosis        oxybutynin 5 MG tablet  Also known as:  DITROPAN  INSTRUCTIONS:  Take 1 tablet (5 mg total) by mouth 2 (two) times a day.  Reason for med:  Urinary Urge Incontinence        risperiDONE microspheres 50 mg/2 mL injection  Also known as:  RisperDAL CONSTA  INSTRUCTIONS:  Inject 2 mL (50 mg total) into the shoulder, thigh, or buttocks every 14 (fourteen) days.  Doctor's comments:  Next due 2/22/19  Reason for med:  Schizophrenia        senna-docusate 8.6-50 mg tablet  Also known as:  sennosides-docusate sodium  INSTRUCTIONS:  Take 2 tablets by mouth 2 (two) times a day.  Reason for med:  constipation            * This list has 5 medication(s) that are the same as other medications prescribed for you. Read the directions carefully, and ask your doctor or other care provider to review them with you.               Where to Get Your Medications      These medications were sent to [facility] [place], [place] - [address]  [address], [facility] [address]    Phone:  [phone]     oxybutynin 5 MG tablet     You can get these medications from any pharmacy    Bring a paper prescription for each of these medications    incontinence pad, liner, disp Pads         Additional Screenings Completed Today:

## 2021-07-13 ENCOUNTER — TRANSFERRED RECORDS (OUTPATIENT)
Dept: HEALTH INFORMATION MANAGEMENT | Facility: CLINIC | Age: 66
End: 2021-07-13

## 2021-07-20 ENCOUNTER — TRANSFERRED RECORDS (OUTPATIENT)
Dept: HEALTH INFORMATION MANAGEMENT | Facility: CLINIC | Age: 66
End: 2021-07-20

## 2021-07-27 ENCOUNTER — TRANSFERRED RECORDS (OUTPATIENT)
Dept: HEALTH INFORMATION MANAGEMENT | Facility: CLINIC | Age: 66
End: 2021-07-27

## 2021-08-03 ENCOUNTER — TRANSFERRED RECORDS (OUTPATIENT)
Dept: HEALTH INFORMATION MANAGEMENT | Facility: CLINIC | Age: 66
End: 2021-08-03

## 2021-08-03 PROBLEM — T40.601A OVERDOSE OF OPIATE OR RELATED NARCOTIC (H): Status: RESOLVED | Noted: 2019-02-08 | Resolved: 2019-03-28

## 2021-08-03 PROBLEM — S06.9XAA TBI (TRAUMATIC BRAIN INJURY) (H): Status: RESOLVED | Noted: 2020-10-26 | Resolved: 2020-10-26

## 2021-08-18 ENCOUNTER — TRANSFERRED RECORDS (OUTPATIENT)
Dept: HEALTH INFORMATION MANAGEMENT | Facility: CLINIC | Age: 66
End: 2021-08-18

## 2021-09-29 ENCOUNTER — TRANSFERRED RECORDS (OUTPATIENT)
Dept: HEALTH INFORMATION MANAGEMENT | Facility: CLINIC | Age: 66
End: 2021-09-29

## 2021-11-11 ENCOUNTER — TRANSFERRED RECORDS (OUTPATIENT)
Dept: HEALTH INFORMATION MANAGEMENT | Facility: CLINIC | Age: 66
End: 2021-11-11
Payer: MEDICARE

## 2022-01-11 ENCOUNTER — TRANSFERRED RECORDS (OUTPATIENT)
Dept: HEALTH INFORMATION MANAGEMENT | Facility: CLINIC | Age: 67
End: 2022-01-11
Payer: MEDICARE

## 2022-05-10 ENCOUNTER — TRANSFERRED RECORDS (OUTPATIENT)
Dept: HEALTH INFORMATION MANAGEMENT | Facility: CLINIC | Age: 67
End: 2022-05-10
Payer: MEDICARE

## 2022-06-08 ENCOUNTER — TRANSFERRED RECORDS (OUTPATIENT)
Dept: HEALTH INFORMATION MANAGEMENT | Facility: CLINIC | Age: 67
End: 2022-06-08
Payer: MEDICARE

## 2022-10-17 ENCOUNTER — TRANSFERRED RECORDS (OUTPATIENT)
Dept: HEALTH INFORMATION MANAGEMENT | Facility: CLINIC | Age: 67
End: 2022-10-17

## 2022-11-15 ENCOUNTER — TRANSFERRED RECORDS (OUTPATIENT)
Dept: HEALTH INFORMATION MANAGEMENT | Facility: CLINIC | Age: 67
End: 2022-11-15

## 2022-12-15 ENCOUNTER — TRANSFERRED RECORDS (OUTPATIENT)
Dept: HEALTH INFORMATION MANAGEMENT | Facility: CLINIC | Age: 67
End: 2022-12-15

## 2023-01-10 ENCOUNTER — TRANSFERRED RECORDS (OUTPATIENT)
Dept: HEALTH INFORMATION MANAGEMENT | Facility: CLINIC | Age: 68
End: 2023-01-10

## 2023-02-23 ENCOUNTER — TRANSFERRED RECORDS (OUTPATIENT)
Dept: HEALTH INFORMATION MANAGEMENT | Facility: CLINIC | Age: 68
End: 2023-02-23

## 2023-03-29 ENCOUNTER — TRANSFERRED RECORDS (OUTPATIENT)
Dept: HEALTH INFORMATION MANAGEMENT | Facility: CLINIC | Age: 68
End: 2023-03-29

## 2023-04-26 ENCOUNTER — TRANSFERRED RECORDS (OUTPATIENT)
Dept: HEALTH INFORMATION MANAGEMENT | Facility: CLINIC | Age: 68
End: 2023-04-26
Payer: MEDICARE

## 2023-05-24 ENCOUNTER — TRANSFERRED RECORDS (OUTPATIENT)
Dept: HEALTH INFORMATION MANAGEMENT | Facility: CLINIC | Age: 68
End: 2023-05-24
Payer: MEDICARE

## 2023-06-27 ENCOUNTER — TRANSFERRED RECORDS (OUTPATIENT)
Dept: HEALTH INFORMATION MANAGEMENT | Facility: CLINIC | Age: 68
End: 2023-06-27
Payer: MEDICARE

## 2023-07-26 ENCOUNTER — TRANSFERRED RECORDS (OUTPATIENT)
Dept: HEALTH INFORMATION MANAGEMENT | Facility: CLINIC | Age: 68
End: 2023-07-26
Payer: MEDICARE

## 2023-08-31 ENCOUNTER — TRANSFERRED RECORDS (OUTPATIENT)
Dept: HEALTH INFORMATION MANAGEMENT | Facility: CLINIC | Age: 68
End: 2023-08-31
Payer: MEDICARE

## 2023-09-22 ENCOUNTER — TRANSFERRED RECORDS (OUTPATIENT)
Dept: HEALTH INFORMATION MANAGEMENT | Facility: CLINIC | Age: 68
End: 2023-09-22
Payer: MEDICARE